# Patient Record
Sex: FEMALE | Race: WHITE | NOT HISPANIC OR LATINO | Employment: UNEMPLOYED | URBAN - METROPOLITAN AREA
[De-identification: names, ages, dates, MRNs, and addresses within clinical notes are randomized per-mention and may not be internally consistent; named-entity substitution may affect disease eponyms.]

---

## 2019-07-12 ENCOUNTER — HOSPITAL ENCOUNTER (EMERGENCY)
Facility: HOSPITAL | Age: 49
Discharge: LEFT BEFORE DISCHARGE | End: 2019-07-12
Admitting: EMERGENCY MEDICINE

## 2019-07-12 VITALS
HEART RATE: 83 BPM | BODY MASS INDEX: 26.29 KG/M2 | WEIGHT: 142.86 LBS | DIASTOLIC BLOOD PRESSURE: 91 MMHG | HEIGHT: 62 IN | OXYGEN SATURATION: 84 % | RESPIRATION RATE: 16 BRPM | SYSTOLIC BLOOD PRESSURE: 159 MMHG | TEMPERATURE: 97.9 F

## 2019-07-12 DIAGNOSIS — G44.201 ACUTE INTRACTABLE TENSION-TYPE HEADACHE: Primary | ICD-10-CM

## 2019-07-12 LAB
ALBUMIN SERPL-MCNC: 4.3 G/DL (ref 3.5–4.8)
ALBUMIN/GLOB SERPL: 1.5 G/DL (ref 1–1.7)
ALP SERPL-CCNC: 70 U/L (ref 32–91)
ALT SERPL W P-5'-P-CCNC: 20 U/L (ref 14–54)
ANION GAP SERPL CALCULATED.3IONS-SCNC: 18.4 MMOL/L (ref 5–15)
AST SERPL-CCNC: 21 U/L (ref 15–41)
BASOPHILS # BLD AUTO: 0.1 10*3/MM3 (ref 0–0.2)
BASOPHILS NFR BLD AUTO: 1.5 % (ref 0–1.5)
BILIRUB SERPL-MCNC: 1 MG/DL (ref 0.3–1.2)
BILIRUB UR QL STRIP: NEGATIVE
BUN BLD-MCNC: 15 MG/DL (ref 8–20)
BUN/CREAT SERPL: 16.7 (ref 5.4–26.2)
CALCIUM SPEC-SCNC: 9.3 MG/DL (ref 8.9–10.3)
CHLORIDE SERPL-SCNC: 99 MMOL/L (ref 101–111)
CLARITY UR: CLEAR
CO2 SERPL-SCNC: 20 MMOL/L (ref 22–32)
COLOR UR: YELLOW
CREAT BLD-MCNC: 0.9 MG/DL (ref 0.4–1)
DEPRECATED RDW RBC AUTO: 49.4 FL (ref 37–54)
EOSINOPHIL # BLD AUTO: 0.1 10*3/MM3 (ref 0–0.4)
EOSINOPHIL NFR BLD AUTO: 0.8 % (ref 0.3–6.2)
ERYTHROCYTE [DISTWIDTH] IN BLOOD BY AUTOMATED COUNT: 13.4 % (ref 12.3–15.4)
GFR SERPL CREATININE-BSD FRML MDRD: 66 ML/MIN/1.73
GLOBULIN UR ELPH-MCNC: 2.8 GM/DL (ref 2.5–3.8)
GLUCOSE BLD-MCNC: 110 MG/DL (ref 65–99)
GLUCOSE UR STRIP-MCNC: NEGATIVE MG/DL
HCT VFR BLD AUTO: 41 % (ref 34–46.6)
HGB BLD-MCNC: 14.3 G/DL (ref 12–15.9)
HGB UR QL STRIP.AUTO: NEGATIVE
HOLD SPECIMEN: NORMAL
KETONES UR QL STRIP: NEGATIVE
LEUKOCYTE ESTERASE UR QL STRIP.AUTO: NEGATIVE
LIPASE SERPL-CCNC: 28 U/L (ref 22–51)
LYMPHOCYTES # BLD AUTO: 3 10*3/MM3 (ref 0.7–3.1)
LYMPHOCYTES NFR BLD AUTO: 32.9 % (ref 19.6–45.3)
MACROCYTES BLD QL SMEAR: NORMAL
MCH RBC QN AUTO: 36.9 PG (ref 26.6–33)
MCHC RBC AUTO-ENTMCNC: 34.8 G/DL (ref 31.5–35.7)
MCV RBC AUTO: 106 FL (ref 79–97)
MONOCYTES # BLD AUTO: 0.6 10*3/MM3 (ref 0.1–0.9)
MONOCYTES NFR BLD AUTO: 6.4 % (ref 5–12)
NEUTROPHILS # BLD AUTO: 5.3 10*3/MM3 (ref 1.7–7)
NEUTROPHILS NFR BLD AUTO: 58.4 % (ref 42.7–76)
NITRITE UR QL STRIP: NEGATIVE
NRBC BLD AUTO-RTO: 0.2 /100 WBC (ref 0–0.2)
PH UR STRIP.AUTO: 6 [PH] (ref 5–8)
PLAT MORPH BLD: NORMAL
PLATELET # BLD AUTO: 267 10*3/MM3 (ref 140–450)
PMV BLD AUTO: 8.8 FL (ref 6–12)
POTASSIUM BLD-SCNC: 3.4 MMOL/L (ref 3.6–5.1)
PROT SERPL-MCNC: 7.1 G/DL (ref 6.1–7.9)
PROT UR QL STRIP: NEGATIVE
RBC # BLD AUTO: 3.87 10*6/MM3 (ref 3.77–5.28)
SODIUM BLD-SCNC: 134 MMOL/L (ref 136–144)
SP GR UR STRIP: <=1.005 (ref 1–1.03)
UROBILINOGEN UR QL STRIP: NORMAL
WBC MORPH BLD: NORMAL
WBC NRBC COR # BLD: 9.1 10*3/MM3 (ref 3.4–10.8)

## 2019-07-12 PROCEDURE — 25010000002 ONDANSETRON PER 1 MG: Performed by: PHYSICIAN ASSISTANT

## 2019-07-12 PROCEDURE — 85007 BL SMEAR W/DIFF WBC COUNT: CPT | Performed by: PHYSICIAN ASSISTANT

## 2019-07-12 PROCEDURE — 96374 THER/PROPH/DIAG INJ IV PUSH: CPT

## 2019-07-12 PROCEDURE — 83690 ASSAY OF LIPASE: CPT | Performed by: PHYSICIAN ASSISTANT

## 2019-07-12 PROCEDURE — 85025 COMPLETE CBC W/AUTO DIFF WBC: CPT | Performed by: PHYSICIAN ASSISTANT

## 2019-07-12 PROCEDURE — 99283 EMERGENCY DEPT VISIT LOW MDM: CPT

## 2019-07-12 PROCEDURE — 80053 COMPREHEN METABOLIC PANEL: CPT | Performed by: PHYSICIAN ASSISTANT

## 2019-07-12 PROCEDURE — 81003 URINALYSIS AUTO W/O SCOPE: CPT | Performed by: PHYSICIAN ASSISTANT

## 2019-07-12 RX ORDER — METOCLOPRAMIDE HYDROCHLORIDE 5 MG/ML
10 INJECTION INTRAMUSCULAR; INTRAVENOUS ONCE
Status: DISCONTINUED | OUTPATIENT
Start: 2019-07-12 | End: 2019-07-12 | Stop reason: HOSPADM

## 2019-07-12 RX ORDER — SODIUM CHLORIDE 0.9 % (FLUSH) 0.9 %
10 SYRINGE (ML) INJECTION AS NEEDED
Status: DISCONTINUED | OUTPATIENT
Start: 2019-07-12 | End: 2019-07-12 | Stop reason: HOSPADM

## 2019-07-12 RX ORDER — DIPHENHYDRAMINE HYDROCHLORIDE 50 MG/ML
25 INJECTION INTRAMUSCULAR; INTRAVENOUS ONCE
Status: DISCONTINUED | OUTPATIENT
Start: 2019-07-12 | End: 2019-07-12 | Stop reason: HOSPADM

## 2019-07-12 RX ORDER — ONDANSETRON 2 MG/ML
4 INJECTION INTRAMUSCULAR; INTRAVENOUS ONCE
Status: COMPLETED | OUTPATIENT
Start: 2019-07-12 | End: 2019-07-12

## 2019-07-12 RX ORDER — SODIUM CHLORIDE 9 MG/ML
125 INJECTION, SOLUTION INTRAVENOUS CONTINUOUS
Status: DISCONTINUED | OUTPATIENT
Start: 2019-07-12 | End: 2019-07-12 | Stop reason: HOSPADM

## 2019-07-12 RX ADMIN — ONDANSETRON 4 MG: 2 INJECTION INTRAMUSCULAR; INTRAVENOUS at 19:40

## 2019-07-12 NOTE — ED PROVIDER NOTES
"Subjective   History of Present Illness  Patient is a 48-year-old female [facility] significant for hypertension, hyperlipidemia, chronic back pain who presents with headache with bandlike distribution across her forehead for the past 2 days.  She describes it as an intermittent throbbing type headache.  Asked thunderclap or worst headache of her life.  Currently rates it mild in severity.  She did have some associated dizziness earlier today but does not have any right now.  Patient states she is also noticed that her blood pressure has been elevated over the past 2 days.  Patient states she is on blood pressure medication but only takes it when she \"feels like she needs to\" day she will take a half a pill other day she will take a full pill depending on how she feels.  She reports associated nausea.  Recent fever, chest pain, vomiting, abdominal pain.  Patient states she does have shortness of breath but states it is been occurring for several months and she does have a follow-up with pulmonology in September.  Dates her shortness of breath has not increased over the past 2 days.  Patient states she does smoke 2 packs of cigarettes daily.  She does drink 2 mixed drinks 2-3 times a week.  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  She also reports increased frequency of urination but denies any dysuria or hematuria.  . Review of Systems   Constitutional: Negative for appetite change, chills, diaphoresis, fatigue and fever.   HENT: Negative for congestion, dental problem, ear pain, facial swelling, nosebleeds, postnasal drip, rhinorrhea, sinus pressure, sinus pain, trouble swallowing and voice change.    Respiratory: Positive for shortness of breath. Negative for apnea, cough, choking, chest tightness, wheezing and stridor.    Cardiovascular: Negative for chest pain and palpitations.   Gastrointestinal: Positive for nausea. Negative for abdominal pain, constipation, diarrhea and " vomiting.   Genitourinary: Positive for frequency and urgency. Negative for decreased urine volume, difficulty urinating, flank pain, hematuria, pelvic pain, vaginal discharge and vaginal pain.   Musculoskeletal: Positive for back pain. Negative for gait problem, joint swelling and neck pain.   Skin: Negative for color change, rash and wound.   Neurological: Positive for dizziness and headaches. Negative for tremors, seizures, syncope, facial asymmetry, speech difficulty, weakness, light-headedness and numbness.   Hematological: Does not bruise/bleed easily.       Past Medical History:   Diagnosis Date   • COPD (chronic obstructive pulmonary disease) (CMS/Formerly [facility])    • Hyperlipidemia    • Hypertension    • Stroke (CMS/[facility]ly [facility])        Allergies   Allergen Reactions   • Iodinated Diagnostic Agents Anaphylaxis   • Methylprednisolone Palpitations   • Prednisone Palpitations       Past Surgical History:   Procedure Laterality Date   • HYSTERECTOMY         History reviewed. No pertinent family history.    Social History     Socioeconomic History   • Marital status:      Spouse name: Not on file   • Number of children: Not on file   • Years of education: Not on file   • Highest education level: Not on file   Tobacco Use   • Smoking status: Current Every Day Smoker     Packs/day: 2.00     Types: Cigarettes   • Smokeless tobacco: Never Used   Substance and Sexual Activity   • Alcohol use: Yes     Alcohol/week: 1.8 oz     Types: 3 Shots of liquor per week   • Drug use: No   • Sexual activity: Yes           Objective   Physical Exam  The patient is well developed, well nourished, alert, cooperative and in no acute distress.    HEENT exam is normocephalic atraumatic. Pupils are equal round and reactive to light. EOMI.  Mucous membranes are moist.     Lungs expiratory wheezes to auscultation bilaterally chest wall expansion equal without retraction    Cardiovascular:  Regular rate and rhythm without murmur there is no peripheral  "edema, cyanosis or pallor.        Abdomen is soft, nontender, nondistended.  No guarding or rebound noted  noted. Bowel sounds are present.      Back shows no CVA tenderness.  Neck and back show no spinal tenderness or bony step-off.  There is no asymmetry of spinal muscles , no tenderness, decreased range of motion or muscular spasm.      Neurologic:  Alert and oriented without focal neurological deficits. GCS 15.  Normal and equal sensation and strength throughout.    Skin shows no rash, petechiae, or purpura.    Procedures           ED Course  ED Course as of Jul 12 2031 Fri Jul 12, 2019   1938 BP: (!) 189/111 [AA]      ED Course User Index  [AA] Stephanie Sen PA    /91   Pulse 83   Temp 97.9 °F (36.6 °C) (Oral)   Resp 16   Ht 157.5 cm (62\")   Wt 64.8 kg (142 lb 13.7 oz)   SpO2 (!) 84%   BMI 26.13 kg/m²   Labs Reviewed   COMPREHENSIVE METABOLIC PANEL - Abnormal; Notable for the following components:       Result Value    Glucose 110 (*)     Sodium 134 (*)     Potassium 3.4 (*)     Chloride 99 (*)     CO2 20.0 (*)     Anion Gap 18.4 (*)     All other components within normal limits   URINALYSIS W/ MICROSCOPIC IF INDICATED (NO CULTURE) - Normal    Narrative:     Urine microscopic not indicated.   LIPASE - Normal   CBC WITH AUTO DIFFERENTIAL   SCAN SLIDE   EXTRA TUBES    Narrative:     The following orders were created for panel order Extra Tubes.  Procedure                               Abnormality         Status                     ---------                               -----------         ------                     Gold Top - SST[[id number]]                                   Final result                 Please view results for these tests on the individual orders.   [facility]   CBC AND DIFFERENTIAL    Narrative:     The following orders were created for panel order CBC & Differential.  Procedure                               Abnormality         Status                     ---------       "                         -----------         ------                     Scan Slide[[id number]]                                       In process                 CBC Auto Differential[[id number]]                            In process                   Please view results for these tests on the individual orders.                   MDM  Number of Diagnoses or Management Options  Diagnosis management comments: Chart Review:  Comorbidity: Pretension, hyperlipidemia, back pain  Differentials: CVA, migraine, hypertensive crisis, neuritis;this list is not all inclusive and does not constitute the entirety of considered causes    Labs: Urinalysis was negative for UTI.  CBC and CMP are pending.  Imaging: Was interpreted by physician and reviewed by myself: CT of head was ordered but patient declined.  Disposition/Treatment: While in the ED IV was placed labs were obtained.  While in the ED IV was placed labs were ordered but patient stated that she wanted to leave that she was feeling better.  She declined all medication including fluids, Compazine, Benadryl. She  Did take the Zofran for nausea.  Went back in and spoke with the patient in regards to why she did not want any of the medication or lab work-up or head CT.  Patient states that she really did not want to stay she states that she was feeling better and will follow up with primary care first thing on Monday.  Discussion with the patient of importance of compliance with her current medication because she states she does not take her blood pressure medication as prescribed.  Discussed risks of leaving AMA.  Patient states she still wanted to leave AMA.  She was stable throughout ED stay and at time of discharge.         Amount and/or Complexity of Data Reviewed  Clinical lab tests: reviewed  Decide to obtain previous medical records or to obtain history from someone other than the patient: yes          Final diagnoses:   Acute intractable tension-type headache             Stephanie Sen PA  07/12/19 2031

## 2020-02-06 ENCOUNTER — APPOINTMENT (OUTPATIENT)
Dept: GENERAL RADIOLOGY | Facility: HOSPITAL | Age: 50
End: 2020-02-06

## 2020-02-06 ENCOUNTER — HOSPITAL ENCOUNTER (EMERGENCY)
Facility: HOSPITAL | Age: 50
Discharge: HOME | End: 2020-02-06
Admitting: EMERGENCY MEDICINE

## 2020-02-06 VITALS
TEMPERATURE: 97.9 F | OXYGEN SATURATION: 98 % | HEART RATE: 77 BPM | RESPIRATION RATE: 16 BRPM | BODY MASS INDEX: 24.67 KG/M2 | SYSTOLIC BLOOD PRESSURE: 147 MMHG | DIASTOLIC BLOOD PRESSURE: 86 MMHG | WEIGHT: 134.04 LBS | HEIGHT: 62 IN

## 2020-02-06 DIAGNOSIS — R05.3 CHRONIC COUGH: ICD-10-CM

## 2020-02-06 DIAGNOSIS — J40 BRONCHITIS: Primary | ICD-10-CM

## 2020-02-06 DIAGNOSIS — F17.200 SMOKER: ICD-10-CM

## 2020-02-06 LAB
BASOPHILS # BLD AUTO: 0.1 10*3/MM3 (ref 0–0.2)
BASOPHILS NFR BLD AUTO: 1.4 % (ref 0–1.5)
DEPRECATED RDW RBC AUTO: 50.8 FL (ref 37–54)
EOSINOPHIL # BLD AUTO: 0.1 10*3/MM3 (ref 0–0.4)
EOSINOPHIL NFR BLD AUTO: 1.2 % (ref 0.3–6.2)
ERYTHROCYTE [DISTWIDTH] IN BLOOD BY AUTOMATED COUNT: 13.7 % (ref 12.3–15.4)
FLUAV SUBTYP SPEC NAA+PROBE: NOT DETECTED
FLUBV RNA ISLT QL NAA+PROBE: NOT DETECTED
HCT VFR BLD AUTO: 42.5 % (ref 34–46.6)
HGB BLD-MCNC: 14.9 G/DL (ref 12–15.9)
LYMPHOCYTES # BLD AUTO: 2.1 10*3/MM3 (ref 0.7–3.1)
LYMPHOCYTES NFR BLD AUTO: 36.8 % (ref 19.6–45.3)
MCH RBC QN AUTO: 37.2 PG (ref 26.6–33)
MCHC RBC AUTO-ENTMCNC: 35 G/DL (ref 31.5–35.7)
MCV RBC AUTO: 106.3 FL (ref 79–97)
MONOCYTES # BLD AUTO: 0.4 10*3/MM3 (ref 0.1–0.9)
MONOCYTES NFR BLD AUTO: 6.4 % (ref 5–12)
NEUTROPHILS # BLD AUTO: 3.1 10*3/MM3 (ref 1.7–7)
NEUTROPHILS NFR BLD AUTO: 54.2 % (ref 42.7–76)
NRBC BLD AUTO-RTO: 0 /100 WBC (ref 0–0.2)
PLATELET # BLD AUTO: 236 10*3/MM3 (ref 140–450)
PMV BLD AUTO: 8.3 FL (ref 6–12)
RBC # BLD AUTO: 4 10*6/MM3 (ref 3.77–5.28)
WBC NRBC COR # BLD: 5.7 10*3/MM3 (ref 3.4–10.8)

## 2020-02-06 PROCEDURE — 71046 X-RAY EXAM CHEST 2 VIEWS: CPT

## 2020-02-06 PROCEDURE — 99283 EMERGENCY DEPT VISIT LOW MDM: CPT

## 2020-02-06 PROCEDURE — 87502 INFLUENZA DNA AMP PROBE: CPT | Performed by: NURSE PRACTITIONER

## 2020-02-06 PROCEDURE — 85025 COMPLETE CBC W/AUTO DIFF WBC: CPT | Performed by: NURSE PRACTITIONER

## 2020-02-06 RX ORDER — RANITIDINE 150 MG/1
150 TABLET ORAL 2 TIMES DAILY
COMMUNITY
End: 2021-09-30

## 2020-02-06 RX ORDER — HYDROCODONE BITARTRATE AND ACETAMINOPHEN 10; 325 MG/1; MG/1
1 TABLET ORAL
COMMUNITY

## 2020-02-06 RX ORDER — LOSARTAN POTASSIUM 25 MG/1
25 TABLET ORAL DAILY
COMMUNITY
End: 2021-09-30

## 2020-02-06 RX ORDER — ATORVASTATIN CALCIUM 80 MG/1
80 TABLET, FILM COATED ORAL DAILY
COMMUNITY
End: 2021-09-23

## 2020-02-06 RX ORDER — AZITHROMYCIN 250 MG/1
TABLET, FILM COATED ORAL
Qty: 6 TABLET | Refills: 0 | OUTPATIENT
Start: 2020-02-06 | End: 2021-09-19

## 2020-02-06 NOTE — DISCHARGE INSTRUCTIONS
Patient encouraged to quit smoking she is to take antibiotic as directed she is to follow-up if increased symptoms

## 2020-02-06 NOTE — ED PROVIDER NOTES
Subjective   Patient is a 49-year-old white female looking older than current age comes in with complaints of cough right-sided rib pain hurts when she coughs states was up all night last night coughing nonproductive no fevers not been around any sick contacts is a smoker denies any chest pain no shortness of breath no GI or  symptoms.   is allergic to steroids makes her heart race and blood pressure go up.   cannot take most inhalers due to this is supposed to see a pulmonologist but he is to see docs and been putting it off.      History provided by:  Patient  Cough   Cough characteristics:  Non-productive  Sputum characteristics:  Nondescript  Severity:  Moderate  Onset quality:  Gradual  Duration:  2 days  Timing:  Unable to specify  Progression:  Worsening  Chronicity:  Recurrent  Smoker: yes    Context: not animal exposure, not exposure to allergens, not fumes, not occupational exposure, not sick contacts, not smoke exposure, not upper respiratory infection, not weather changes and not with activity    Relieved by:  None tried  Worsened by:  Nothing  Ineffective treatments:  None tried  Associated symptoms: no chest pain, no chills, no diaphoresis, no ear fullness, no ear pain, no eye discharge, no fever, no headaches, no myalgias, no rash, no rhinorrhea, no shortness of breath, no sinus congestion, no sore throat, no weight loss and no wheezing        Review of Systems   Constitutional: Negative for activity change, appetite change, chills, diaphoresis, fatigue, fever and weight loss.   HENT: Negative for congestion, ear pain, rhinorrhea, sore throat and trouble swallowing.    Eyes: Negative for discharge and redness.   Respiratory: Positive for cough. Negative for apnea, chest tightness, shortness of breath, wheezing and stridor.    Cardiovascular: Negative for chest pain, palpitations and leg swelling.   Gastrointestinal: Negative for abdominal distention, abdominal pain and nausea.    Musculoskeletal: Positive for arthralgias. Negative for back pain, gait problem, joint swelling, myalgias and neck pain.        Right side rib pain   Skin: Negative for color change, pallor and rash.   Neurological: Negative for dizziness, numbness and headaches.       Past Medical History:   Diagnosis Date   • COPD (chronic obstructive pulmonary disease) (CMS/[facility])    • Hyperlipidemia    • Hypertension    • Stroke (CMS/[facility])        Allergies   Allergen Reactions   • Iodinated Diagnostic Agents Anaphylaxis   • Methylprednisolone Palpitations   • Prednisone Palpitations       Past Surgical History:   Procedure Laterality Date   • HYSTERECTOMY         History reviewed. No pertinent family history.    Social History     Socioeconomic History   • Marital status:      Spouse name: Not on file   • Number of children: Not on file   • Years of education: Not on file   • Highest education level: Not on file   Tobacco Use   • Smoking status: Current Every Day Smoker     Packs/day: 2.00     Types: Cigarettes   • Smokeless tobacco: Never Used   Substance and Sexual Activity   • Alcohol use: Yes     Alcohol/week: 3.0 standard drinks     Types: 3 Shots of liquor per week   • Drug use: No   • Sexual activity: Yes           Objective   Physical Exam   Constitutional: She is oriented to person, place, and time. She appears well-developed and well-nourished. No distress.   Patient smells of smoke looks older than current age   HENT:   Head: Normocephalic and atraumatic.   Right Ear: External ear normal.   Left Ear: External ear normal.   Nose: Nose normal.   Mouth/Throat: Oropharynx is clear and moist. No oropharyngeal exudate.   Eyes: Pupils are equal, round, and reactive to light. Conjunctivae and EOM are normal.   Neck: Normal range of motion. Neck supple.   Cardiovascular: Normal rate, regular rhythm, normal heart sounds and intact distal pulses. Exam reveals no gallop and no friction rub.   No murmur  heard.  Pulmonary/Chest: Effort normal and breath sounds normal. No stridor. No respiratory distress. She has no wheezes. She has no rales. She exhibits no tenderness.   Abdominal: Soft. Bowel sounds are normal. She exhibits no distension. There is no tenderness.   Musculoskeletal: Normal range of motion.   Neurological: She is alert and oriented to person, place, and time.   Skin: Skin is warm and dry. No rash noted. She is not diaphoretic.   Psychiatric: She has a normal mood and affect. Her behavior is normal. Judgment and thought content normal.   Nursing note and vitals reviewed.      Procedures           ED Course  ED Course as of Feb 06 1118   Thu Feb 06, 2020   1045 CBC normal influenza negative    []   1046 Chest x-ray normal    []      ED Course User Index  [] Eliana Cedillo APRN           Xr Chest 2 View    Result Date: 2/6/2020  No change from the previous study with no evidence for acute cardiopulmonary process.  Electronically Signed By-Francisco Snow On:2/6/2020 10:42 AM This report was finalized on [id number] by  Francisco Snow, .    Medications - No data to display  Labs Reviewed   CBC WITH AUTO DIFFERENTIAL - Abnormal; Notable for the following components:       Result Value    .3 (*)     MCH 37.2 (*)     All other components within normal limits   INFLUENZA ANTIGEN, RAPID - Normal   CBC AND DIFFERENTIAL    Narrative:     The following orders were created for panel order CBC & Differential.  Procedure                               Abnormality         Status                     ---------                               -----------         ------                     CBC Auto Differential[[id number]]        Abnormal            Final result                 Please view results for these tests on the individual orders.                                         MDM  Number of Diagnoses or Management Options  Bronchitis: minor  Chronic cough: minor  Smoker:   Diagnosis management comments:  Disposition: Discharged.    Patient discharged in stable condition.  Patient to keep her appointment with pulmonologist  strongly encouraged to quit smoking  Reviewed implications of results, diagnosis, meds, responsibility to follow up, warning signs and symptoms of possible worsening, potential complications and reasons to return to ER increase symptoms chest pain shortness of breath    Patient/Family voiced understanding of above instructions.    Discussed plan for discharge, as there is no emergent indication for admission.  Pt/family is agreeable and understands need for follow up and repeat testing.  Pt is aware that discharge does not mean that nothing is wrong but it indicates no emergency is present and they must continue care with follow-up as given below or physician of their choice.     FOLLOW-UP  Zari Church, [id number] [place] [address] [place] [phone] an appointment as soon as possible for a visit in 2 daysIf symptoms worsen  [facility] EMERGENCY [id number] [facility] [phone] symptoms worsen       Medication List      New Prescriptions    azithromycin 250 MG tablet  Commonly known as:  ZITHROMAX  Take 2 tablets the first day, then 1 tablet daily for 4 days.               Amount and/or Complexity of Data Reviewed  Clinical lab tests: reviewed  Tests in the radiology section of CPT®: reviewed        Final diagnoses:   Bronchitis   Chronic cough   Smoker            Eliana Cedillo, APRN  02/06/20 1119

## 2020-02-06 NOTE — ED NOTES
Pt presents with chronic congestion cough. States has always had a cough but worse the past few days. Reports pain to R ribs. Denies injury. Pain with breathing. Reports being unable to sleep. States was dx with COPD but has not f/u with pulmonology.      Ebenezer Buitrago, ALEX  02/06/20 8681

## 2020-08-20 ENCOUNTER — APPOINTMENT (OUTPATIENT)
Dept: CT IMAGING | Facility: HOSPITAL | Age: 50
End: 2020-08-20

## 2020-08-20 ENCOUNTER — HOSPITAL ENCOUNTER (EMERGENCY)
Facility: HOSPITAL | Age: 50
Discharge: HOME | End: 2020-08-20
Attending: EMERGENCY MEDICINE | Admitting: EMERGENCY MEDICINE

## 2020-08-20 VITALS
DIASTOLIC BLOOD PRESSURE: 73 MMHG | HEIGHT: 62 IN | RESPIRATION RATE: 18 BRPM | BODY MASS INDEX: 23.81 KG/M2 | TEMPERATURE: 98.9 F | WEIGHT: 129.41 LBS | SYSTOLIC BLOOD PRESSURE: 157 MMHG | HEART RATE: 88 BPM | OXYGEN SATURATION: 99 %

## 2020-08-20 DIAGNOSIS — R51.9 NONINTRACTABLE HEADACHE, UNSPECIFIED CHRONICITY PATTERN, UNSPECIFIED HEADACHE TYPE: ICD-10-CM

## 2020-08-20 DIAGNOSIS — B34.9 VIRAL SYNDROME: Primary | ICD-10-CM

## 2020-08-20 LAB — SARS-COV-2 RNA PNL SPEC NAA+PROBE: NOT DETECTED

## 2020-08-20 PROCEDURE — 99283 EMERGENCY DEPT VISIT LOW MDM: CPT

## 2020-08-20 PROCEDURE — 70450 CT HEAD/BRAIN W/O DYE: CPT

## 2020-08-20 PROCEDURE — 87635 SARS-COV-2 COVID-19 AMP PRB: CPT | Performed by: EMERGENCY MEDICINE

## 2020-08-20 NOTE — ED PROVIDER NOTES
Subjective   Chief complaint: Fever    50-year-old female presents reporting a fever.  Patient states she was not feeling well today just in general.  She had a mild headache earlier.  She bought a new thermometer and took her temperature and has had 105.3.  She states after arriving to the emergency room she is feeling somewhat better.  She denies headache now.  She has a history of COPD and states she has had some mild cough and congestion but states it is nothing out of the ordinary.  She feels like she is at her baseline from a respiratory standpoint.  She states she did not take any Tylenol or ibuprofen.  She was afebrile on arrival to the emergency room.  She states she is wondering if she bought a bad thermometer.      History provided by:  Patient      Review of Systems   Constitutional: Positive for fever.   HENT: Negative for congestion and sore throat.    Eyes: Negative for redness.   Respiratory: Positive for cough. Negative for shortness of breath.    Cardiovascular: Negative for chest pain.   Gastrointestinal: Negative for abdominal pain, diarrhea and vomiting.   Genitourinary: Negative for dysuria.   Musculoskeletal: Negative for back pain.   Skin: Negative for rash.   Neurological: Positive for headaches. Negative for dizziness.   Psychiatric/Behavioral: Negative for confusion.       Past Medical History:   Diagnosis Date   • COPD (chronic obstructive pulmonary disease) (CMS/[facility])    • Hyperlipidemia    • Hypertension    • Stroke (CMS/[facility])        Allergies   Allergen Reactions   • Iodinated Diagnostic Agents Anaphylaxis   • Methylprednisolone Palpitations   • Prednisone Palpitations       Past Surgical History:   Procedure Laterality Date   • HYSTERECTOMY         No family history on file.    Social History     Socioeconomic History   • Marital status:      Spouse name: Not on file   • Number of children: Not on file   • Years of education: Not on file   • Highest education level: Not on file  "  Tobacco Use   • Smoking status: Current Every Day Smoker     Packs/day: 2.00     Types: Cigarettes   • Smokeless tobacco: Never Used   Substance and Sexual Activity   • Alcohol use: Yes     Alcohol/week: 3.0 standard drinks     Types: 3 Shots of liquor per week   • Drug use: No   • Sexual activity: Yes       /61 (BP Location: Right arm, Patient Position: Sitting)   Pulse 82   Temp 98.7 °F (37.1 °C) (Oral)   Resp 18   Ht 157.5 cm (62\")   Wt 58.7 kg (129 lb 6.6 oz)   SpO2 98%   BMI 23.67 kg/m²       Objective   Physical Exam   Constitutional: She is oriented to person, place, and time. She appears well-developed and well-nourished.   HENT:   Head: Normocephalic and atraumatic.   Eyes: Pupils are equal, round, and reactive to light. EOM are normal.   Neck: Normal range of motion. Neck supple.   No meningismus   Cardiovascular: Normal rate, regular rhythm and normal heart sounds.   Pulmonary/Chest: Effort normal and breath sounds normal. No respiratory distress.   Abdominal: Soft. Bowel sounds are normal. There is no tenderness.   Musculoskeletal: Normal range of motion.   Neurological: She is alert and oriented to person, place, and time.   Skin: Skin is warm and dry.   Nursing note and vitals reviewed.      Procedures           ED Course      Results for orders placed or performed during the hospital encounter of 08/20/20   COVID-19,[employer] IN-HOUSE,Nasal Swab No Transport Media 3-4 HR TAT - Swab, Nasal Cavity   Result Value Ref Range    COVID19 Not Detected Not Detected - Ref. Range       CT head shows no acute intracranial abnormality.                                     MDM   Patient had the above evaluation.  Results were discussed with the patient.  Patient is well-appearing in the emergency room.  CT head showed no acute abnormality.  She continues to deny any headache at this time.  COVID screen was negative.  Patient is afebrile.  She is stable for discharge.      Final diagnoses:   Viral " syndrome   Nonintractable headache, unspecified chronicity pattern, unspecified headache type            Damien Alvarez MD  08/20/20 2009

## 2021-09-19 ENCOUNTER — APPOINTMENT (OUTPATIENT)
Dept: GENERAL RADIOLOGY | Facility: HOSPITAL | Age: 51
End: 2021-09-19

## 2021-09-19 ENCOUNTER — HOSPITAL ENCOUNTER (EMERGENCY)
Facility: HOSPITAL | Age: 51
Discharge: LEFT BEFORE DISCHARGE | End: 2021-09-19
Admitting: EMERGENCY MEDICINE

## 2021-09-19 VITALS
WEIGHT: 119.93 LBS | SYSTOLIC BLOOD PRESSURE: 161 MMHG | HEART RATE: 97 BPM | BODY MASS INDEX: 22.07 KG/M2 | HEIGHT: 62 IN | TEMPERATURE: 98.2 F | RESPIRATION RATE: 18 BRPM | OXYGEN SATURATION: 97 % | DIASTOLIC BLOOD PRESSURE: 91 MMHG

## 2021-09-19 DIAGNOSIS — R07.9 CHEST PAIN, UNSPECIFIED TYPE: Primary | ICD-10-CM

## 2021-09-19 LAB
ANION GAP SERPL CALCULATED.3IONS-SCNC: 15 MMOL/L (ref 5–15)
BASOPHILS # BLD AUTO: 0.1 10*3/MM3 (ref 0–0.2)
BASOPHILS NFR BLD AUTO: 0.5 % (ref 0–1.5)
BUN SERPL-MCNC: 12 MG/DL (ref 6–20)
BUN/CREAT SERPL: 16.4 (ref 7–25)
CALCIUM SPEC-SCNC: 9.4 MG/DL (ref 8.6–10.5)
CHLORIDE SERPL-SCNC: 102 MMOL/L (ref 98–107)
CO2 SERPL-SCNC: 23 MMOL/L (ref 22–29)
CREAT SERPL-MCNC: 0.73 MG/DL (ref 0.57–1)
DEPRECATED RDW RBC AUTO: 48.6 FL (ref 37–54)
EOSINOPHIL # BLD AUTO: 0 10*3/MM3 (ref 0–0.4)
EOSINOPHIL NFR BLD AUTO: 0.5 % (ref 0.3–6.2)
ERYTHROCYTE [DISTWIDTH] IN BLOOD BY AUTOMATED COUNT: 13.2 % (ref 12.3–15.4)
GFR SERPL CREATININE-BSD FRML MDRD: 83 ML/MIN/1.73
GLUCOSE SERPL-MCNC: 99 MG/DL (ref 65–99)
HCT VFR BLD AUTO: 39.8 % (ref 34–46.6)
HGB BLD-MCNC: 13.9 G/DL (ref 12–15.9)
HOLD SPECIMEN: NORMAL
HOLD SPECIMEN: NORMAL
LYMPHOCYTES # BLD AUTO: 2.1 10*3/MM3 (ref 0.7–3.1)
LYMPHOCYTES NFR BLD AUTO: 19.9 % (ref 19.6–45.3)
MCH RBC QN AUTO: 36.9 PG (ref 26.6–33)
MCHC RBC AUTO-ENTMCNC: 34.9 G/DL (ref 31.5–35.7)
MCV RBC AUTO: 105.6 FL (ref 79–97)
MONOCYTES # BLD AUTO: 0.6 10*3/MM3 (ref 0.1–0.9)
MONOCYTES NFR BLD AUTO: 6.1 % (ref 5–12)
NEUTROPHILS NFR BLD AUTO: 7.7 10*3/MM3 (ref 1.7–7)
NEUTROPHILS NFR BLD AUTO: 73 % (ref 42.7–76)
NRBC BLD AUTO-RTO: 0.1 /100 WBC (ref 0–0.2)
NT-PROBNP SERPL-MCNC: 432.8 PG/ML (ref 0–900)
PLATELET # BLD AUTO: 260 10*3/MM3 (ref 140–450)
PMV BLD AUTO: 8.2 FL (ref 6–12)
POTASSIUM SERPL-SCNC: 4.3 MMOL/L (ref 3.5–5.2)
RBC # BLD AUTO: 3.77 10*6/MM3 (ref 3.77–5.28)
SARS-COV-2 RNA PNL SPEC NAA+PROBE: NOT DETECTED
SODIUM SERPL-SCNC: 140 MMOL/L (ref 136–145)
TROPONIN T SERPL-MCNC: <0.01 NG/ML (ref 0–0.03)
WBC # BLD AUTO: 10.6 10*3/MM3 (ref 3.4–10.8)
WHOLE BLOOD HOLD SPECIMEN: NORMAL

## 2021-09-19 PROCEDURE — 85025 COMPLETE CBC W/AUTO DIFF WBC: CPT | Performed by: PHYSICIAN ASSISTANT

## 2021-09-19 PROCEDURE — 93005 ELECTROCARDIOGRAM TRACING: CPT

## 2021-09-19 PROCEDURE — 84484 ASSAY OF TROPONIN QUANT: CPT | Performed by: PHYSICIAN ASSISTANT

## 2021-09-19 PROCEDURE — 99283 EMERGENCY DEPT VISIT LOW MDM: CPT

## 2021-09-19 PROCEDURE — 80048 BASIC METABOLIC PNL TOTAL CA: CPT | Performed by: PHYSICIAN ASSISTANT

## 2021-09-19 PROCEDURE — 87635 SARS-COV-2 COVID-19 AMP PRB: CPT | Performed by: PHYSICIAN ASSISTANT

## 2021-09-19 PROCEDURE — 83880 ASSAY OF NATRIURETIC PEPTIDE: CPT | Performed by: PHYSICIAN ASSISTANT

## 2021-09-19 PROCEDURE — 71045 X-RAY EXAM CHEST 1 VIEW: CPT

## 2021-09-19 RX ORDER — SODIUM CHLORIDE 0.9 % (FLUSH) 0.9 %
10 SYRINGE (ML) INJECTION AS NEEDED
Status: DISCONTINUED | OUTPATIENT
Start: 2021-09-19 | End: 2021-09-19 | Stop reason: HOSPADM

## 2021-09-19 RX ORDER — ASPIRIN 81 MG/1
324 TABLET, CHEWABLE ORAL ONCE
Status: COMPLETED | OUTPATIENT
Start: 2021-09-19 | End: 2021-09-19

## 2021-09-19 RX ADMIN — ASPIRIN 81 MG CHEWABLE TABLET 324 MG: 81 TABLET CHEWABLE at 16:05

## 2021-09-19 RX ADMIN — NITROGLYCERIN 1 INCH: 20 OINTMENT TOPICAL at 16:05

## 2021-09-19 RX ADMIN — SODIUM CHLORIDE 500 ML: 9 INJECTION, SOLUTION INTRAVENOUS at 16:09

## 2021-09-19 NOTE — ED PROVIDER NOTES
Subjective   History:  Patient is a 51-year-old female presents to the ER with 5 days of chest pain associated with nausea and difficulty controlling her blood pressure today.  She reports she took half of her blood pressure medication and not a whole 1 because sometimes the bottoms are out.  She reports that she will occasionally have intermittent chest pain but nothing that persist as long as it has today.  She reports because her heart rate was around 60 at home she did not take any of her pain medication for her chronic pain.  No history of CAD.  Does have previous history of CVA.  No radiation to neck or arm reported    Onset: 5 days  Location: Chest  Duration: Constant  Character: Dull pressure  Aggravating/Alleviating factors: None  Radiation none  Severity: Moderate            Review of Systems   Constitutional: Negative for diaphoresis, fatigue and fever.   Respiratory: Negative for cough, choking and shortness of breath.    Cardiovascular: Positive for chest pain.   Gastrointestinal: Positive for nausea. Negative for abdominal distention, abdominal pain and vomiting.   Genitourinary: Negative.    Musculoskeletal: Negative.    Skin: Negative.    Neurological: Negative.    Psychiatric/Behavioral: Negative.        Past Medical History:   Diagnosis Date   • COPD (chronic obstructive pulmonary disease) (CMS/[facility])    • Hyperlipidemia    • Hypertension    • Stroke (CMS/[facility])        Allergies   Allergen Reactions   • Iodinated Diagnostic Agents Anaphylaxis   • Methylprednisolone Palpitations   • Prednisone Palpitations       Past Surgical History:   Procedure Laterality Date   • HYSTERECTOMY         No family history on file.    Social History     Socioeconomic History   • Marital status:      Spouse name: Not on file   • Number of children: Not on file   • Years of education: Not on file   • Highest education level: Not on file   Tobacco Use   • Smoking status: Current Every Day Smoker     Packs/day: 2.00      Types: Cigarettes   • Smokeless tobacco: Never Used   Substance and Sexual Activity   • Alcohol use: Yes     Alcohol/week: 3.0 standard drinks     Types: 3 Shots of liquor per week   • Drug use: No   • Sexual activity: Yes           Objective   Physical Exam  Vitals and nursing note reviewed.   Constitutional:       Appearance: She is well-developed.   HENT:      Head: Normocephalic and atraumatic.   Eyes:      Pupils: Pupils are equal, round, and reactive to light.   Cardiovascular:      Rate and Rhythm: Normal rate and regular rhythm.      Heart sounds: Normal heart sounds.   Pulmonary:      Effort: Pulmonary effort is normal.      Breath sounds: Normal breath sounds. No decreased breath sounds, wheezing, rhonchi or rales.   Musculoskeletal:         General: Normal range of motion.      Cervical back: Normal range of motion.   Skin:     General: Skin is warm and dry.   Neurological:      General: No focal deficit present.      Mental Status: She is alert and oriented to person, place, and time.   Psychiatric:         Mood and Affect: Mood is anxious.         Behavior: Behavior normal.         Procedures           ED Course  ED Course as of Sep 19 1723   Sun Sep 19, 2021   1534 EKG interpreted by ER physician reviewed by myself.  Sinus tachycardia rate of 101 ventricular bigeminy.    [MG]   1648 Reports chest pain is now relieved    [MG]      ED Course User Index  [MG] Marah Pearce, WILFRIDO      XR Chest 1 View    Result Date: 9/19/2021  No acute cardiopulmonary process.  Electronically Signed By-Freida Schneider MD On:9/19/2021 3:40 PM This report was finalized on [id number] by  Freida Schneider MD.    Labs Reviewed   CBC WITH AUTO DIFFERENTIAL - Abnormal; Notable for the following components:       Result Value    .6 (*)     MCH 36.9 (*)     Neutrophils, Absolute 7.70 (*)     All other components within normal limits    Narrative:     Appended report. These results have been appended to a previously verified  report.   COVID-19,CEPHEID/MAURISIO/BDMAX,COR/NIKITA/PAD/ANMOL IN-HOUSE,NP SWAB IN TRANSPORT MEDIA 3-4 HR TAT, RT-PCR - Normal    Narrative:     Fact sheet for providers: https://www.fda.gov/media/844899/download     Fact sheet for patients: https://www.fda.gov/media/835989/download  Fact sheet for providers: https://www.fda.gov/media/243847/download    Fact sheet for patients: https://www.fda.gov/media/765644/download    Test performed by PCR.   BASIC METABOLIC PANEL - Normal    Narrative:     GFR Normal >60  Chronic Kidney Disease <60  Kidney Failure <15     BNP (IN-HOUSE) - Normal    Narrative:     Among patients with dyspnea, NT-proBNP is highly sensitive for the detection of acute congestive heart failure. In addition NT-proBNP of <300 pg/ml effectively rules out acute congestive heart failure with 99% negative predictive value.    Results may be falsely decreased if patient taking Biotin.     TROPONIN (IN-HOUSE) - Normal    Narrative:     Troponin T Reference Range:  <= 0.03 ng/mL-   Negative for AMI  >0.03 ng/mL-     Abnormal for myocardial necrosis.  Clinicians would have to utilize clinical acumen, EKG, Troponin and serial changes to determine if it is an Acute Myocardial Infarction or myocardial injury due to an underlying chronic condition.       Results may be falsely decreased if patient taking Biotin.     CBC AND DIFFERENTIAL    Narrative:     The following orders were created for panel order CBC & Differential.  Procedure                               Abnormality         Status                     ---------                               -----------         ------                     CBC Auto Differential[[id number]]        Abnormal            Final result               Scan Slide[[id number]]                                                                    Please view results for these tests on the individual orders.   EXTRA TUBES    Narrative:     The following orders were created for panel order Extra  Tubes.  Procedure                               Abnormality         Status                     ---------                               -----------         ------                     Gold Top - SST[[id number]]                                   Final result               Green Top (Gel)[[id number]]                                  Final result               Light Blue Top[[id number]]                                   Final result                 Please view results for these tests on the individual orders.   GOLD TOP - SST   GREEN TOP   LIGHT BLUE TOP     Medications   sodium chloride 0.9 % flush 10 mL (has no administration in time range)   sodium chloride 0.9 % bolus 500 mL (500 mL Intravenous New Bag 9/19/21 1609)   nitroglycerin (NITROSTAT) ointment 1 inch (1 inch Topical Given 9/19/21 1605)   aspirin chewable tablet 324 mg (324 mg Oral Given 9/19/21 1605)                                          MDM  Number of Diagnoses or Management Options  Chest pain, unspecified type  Diagnosis management comments: I examined the patient using the appropriate personal protective equipment.      DISPOSITION:   Chart Review:  Comorbidity:  has a past medical history of COPD (chronic obstructive pulmonary disease) (CMS/[facility]), Hyperlipidemia, Hypertension, and Stroke (CMS/[facility]).  Differentials:this list is not all inclusive and does not constitute the entirety of considered causes --> cardiac chest pain, hypertensive urgency, pleuritic chest pain    Imaging: Was interpreted by physician and reviewed by myself:  XR Chest 1 View    Result Date: 9/19/2021  No acute cardiopulmonary process.  Electronically Signed By-Freida Schneider MD On:9/19/2021 3:40 PM This report was finalized on [id number] by  Freida Schneider MD.      Disposition/Treatment:  Patient is a 51-year-old female who presents to the ER with 5 days of chest pain and high blood pressure today.  Patient is given Nitropaste and aspirin IV fluids with resolution of all of her  symptoms.  I did recommend that she stay for admittance in cardiac rule out.  Patient did not want sedation was to follow-up in the outpatient setting.  She reports that she does not like hospitals.  I did recommend that she stay and she refused.  Patient will be given cardiology referral.         Amount and/or Complexity of Data Reviewed  Clinical lab tests: reviewed  Tests in the radiology section of CPT®: reviewed  Tests in the medicine section of CPT®: reviewed    Patient Progress  Patient progress: improved      Final diagnoses:   Chest pain, unspecified type       ED Disposition  ED Disposition     ED Disposition Condition Comment    Zari Calle PA  [address] [place] [address]  [phone]    Call in 1 day  As needed, For further management    Prabhu Duke MD  [address] [facility] [place] [facility]  [phone]    Schedule an appointment as soon as possible for a visit            Medication List      Stop    azithromycin 250 MG tablet  Commonly known as: ZITHROMAX             Marah Pearce PA-C  09/19/21 7527

## 2021-09-22 LAB — QT INTERVAL: 348 MS

## 2021-09-23 ENCOUNTER — OFFICE VISIT (OUTPATIENT)
Dept: CARDIOLOGY | Facility: CLINIC | Age: 51
End: 2021-09-23

## 2021-09-23 VITALS
DIASTOLIC BLOOD PRESSURE: 95 MMHG | BODY MASS INDEX: 28.7 KG/M2 | WEIGHT: 124 LBS | HEIGHT: 55 IN | HEART RATE: 70 BPM | SYSTOLIC BLOOD PRESSURE: 156 MMHG

## 2021-09-23 DIAGNOSIS — R07.9 CHEST PAIN, UNSPECIFIED TYPE: Primary | ICD-10-CM

## 2021-09-23 DIAGNOSIS — R06.02 SOB (SHORTNESS OF BREATH): ICD-10-CM

## 2021-09-23 DIAGNOSIS — R00.2 PALPITATIONS: ICD-10-CM

## 2021-09-23 DIAGNOSIS — E78.41 ELEVATED LIPOPROTEIN(A): ICD-10-CM

## 2021-09-23 PROCEDURE — 99203 OFFICE O/P NEW LOW 30 MIN: CPT | Performed by: INTERNAL MEDICINE

## 2021-09-23 RX ORDER — ASPIRIN 81 MG/1
81 TABLET ORAL DAILY
COMMUNITY

## 2021-09-23 RX ORDER — NITROGLYCERIN 0.4 MG/1
TABLET SUBLINGUAL
Qty: 30 TABLET | Refills: 2 | Status: SHIPPED | OUTPATIENT
Start: 2021-09-23 | End: 2021-09-30

## 2021-09-23 NOTE — PROGRESS NOTES
"Cardiology Office Visit      Encounter Date:  09/23/2021    Patient ID:   Kamini Graham is a 51 y.o. female.    Reason For Followup:  Chest pain  Palpitations  Shortness of breath    Brief Clinical History:  Dear Dr. Church, ARNAUD Rios    I had the pleasure of seeing Kamini Graham today. As you are well aware, this is a 51 y.o. female the past medical history that is significant for history of tobacco abuse history of hypertension hyperlipidemia strong family history of coronary artery disease and premature coronary artery disease presented to the office with symptoms of shortness of breath chest pain palpitations    Interval History:  Patient was seen in the emergency room for similar symptoms  Complaining of worsening symptoms in the last several weeks  Complaining of symptoms of chest discomfort shortness of breath anxiety palpitations  Denies any syncope  No orthopnea no PND  Strong family history of coronary artery disease and premature coronary artery disease  History of tobacco abuse  Noted recent cardiac evaluation or work-up    Assessment & Plan    Impressions:  Chest pain  Palpitations  Shortness of breath  Ventricular ectopy  Family history of coronary artery disease and premature coronary artery disease  Tobacco abuse  Ventricular bigeminy    Recommendations:  Schedule patient for an echocardiogram to assess LV systolic function rule out significant myocardial dysfunction or valve pathology  Schedule patient for myocardial perfusion study for further stratification for symptoms of chest discomfort  Schedule patient for a 48-hour Holter monitor to further assess the ventricular ectopy  Recent labs and work-up that was done in the emergency room reviewed and discussed with patient  Sublingual nitroglycerin as needed for chest pain  Follow-up in office in 1 month    Objective:    Vitals:  Vitals:    09/23/21 1538   BP: 156/95   Pulse: 70   Weight: 56.2 kg (124 lb)   Height: 62 cm (24.41\") "       Physical Exam:    General: Alert, cooperative, no distress, appears stated age  Head:  Normocephalic, atraumatic, mucous membranes moist  Eyes:  Conjunctiva/corneas clear, EOM's intact     Neck:  Supple,  no adenopathy;      Lungs: Clear to auscultation bilaterally, no wheezes rhonchi rales are noted  Chest wall: No tenderness  Heart::  Regular rate and rhythm, S1 and S2 normal, no murmur, rub or gallop  Abdomen: Soft, non-tender, nondistended bowel sounds active  Extremities: No cyanosis, clubbing, or edema  Pulses: 2+ and symmetric all extremities  Skin:  No rashes or lesions  Neuro/psych: A&O x3. CN II through XII are grossly intact with appropriate affect      Allergies:  Allergies   Allergen Reactions   • Iodinated Diagnostic Agents Anaphylaxis   • Methylprednisolone Palpitations   • Prednisone Palpitations       Medication Review:     Current Outpatient Medications:   •  aspirin 81 MG EC tablet, Take 81 mg by mouth Daily., Disp: , Rfl:   •  HYDROcodone-acetaminophen (NORCO)  MG per tablet, Take 1 tablet by mouth Every 6 (Six) Hours As Needed for Moderate Pain ., Disp: , Rfl:   •  losartan (COZAAR) 25 MG tablet, Take 25 mg by mouth Daily., Disp: , Rfl:   •  raNITIdine (ZANTAC) 150 MG tablet, Take 150 mg by mouth 2 (Two) Times a Day., Disp: , Rfl:   •  nitroglycerin (NITROSTAT) 0.4 MG SL tablet, 1 under the tongue as needed for angina, may repeat q5mins for up three doses, Disp: 30 tablet, Rfl: 2    Family History:  History reviewed. No pertinent family history.    Past Medical History:  Past Medical History:   Diagnosis Date   • COPD (chronic obstructive pulmonary disease) (CMS/[facility])    • Hyperlipidemia    • Hypertension    • Stroke (CMS/[facility])        Past surgical History:  Past Surgical History:   Procedure Laterality Date   • HYSTERECTOMY         Social History:  Social History     Socioeconomic History   • Marital status:      Spouse name: Not on file   • Number of children: Not on file   •  Years of education: Not on file   • Highest education level: Not on file   Tobacco Use   • Smoking status: Current Every Day Smoker     Packs/day: 2.00     Types: Cigarettes   • Smokeless tobacco: Never Used   Substance and Sexual Activity   • Alcohol use: Yes     Alcohol/week: 3.0 standard drinks     Types: 3 Shots of liquor per week   • Drug use: No   • Sexual activity: Yes       Review of Systems:  The following systems were reviewed as they relate to the cardiovascular system: Constitutional, Eyes, ENT, Cardiovascular, Respiratory, Gastrointestinal, Integumentary, Neurological, Psychiatric, Hematologic, Endocrine, Musculoskeletal, and Genitourinary. The pertinent cardiovascular findings are reported above with all other cardiovascular points within those systems being negative.    Diagnostic Study Review:     Current Electrocardiogram:  Procedures      NOTE: The following portions of the patient's history were reviewed and updated this visit as appropriate: allergies, current medications, past family history, past medical history, past social history, past surgical history and problem list.

## 2021-09-29 ENCOUNTER — HOSPITAL ENCOUNTER (OUTPATIENT)
Facility: HOSPITAL | Age: 51
Setting detail: OBSERVATION
Discharge: LEFT BEFORE DISCHARGE | End: 2021-09-30
Attending: NURSE PRACTITIONER | Admitting: INTERNAL MEDICINE

## 2021-09-29 ENCOUNTER — TELEPHONE (OUTPATIENT)
Dept: CARDIOLOGY | Facility: CLINIC | Age: 51
End: 2021-09-29

## 2021-09-29 ENCOUNTER — APPOINTMENT (OUTPATIENT)
Dept: GENERAL RADIOLOGY | Facility: HOSPITAL | Age: 51
End: 2021-09-29

## 2021-09-29 DIAGNOSIS — R07.9 CHEST PAIN, UNSPECIFIED TYPE: Primary | ICD-10-CM

## 2021-09-29 LAB
ALBUMIN SERPL-MCNC: 4.8 G/DL (ref 3.5–5.2)
ALBUMIN/GLOB SERPL: 1.8 G/DL
ALP SERPL-CCNC: 88 U/L (ref 39–117)
ALT SERPL W P-5'-P-CCNC: 12 U/L (ref 1–33)
ANION GAP SERPL CALCULATED.3IONS-SCNC: 17 MMOL/L (ref 5–15)
AST SERPL-CCNC: 18 U/L (ref 1–32)
BASOPHILS # BLD AUTO: 0 10*3/MM3 (ref 0–0.2)
BASOPHILS # BLD AUTO: 0.1 10*3/MM3 (ref 0–0.2)
BASOPHILS NFR BLD AUTO: 0.4 % (ref 0–1.5)
BASOPHILS NFR BLD AUTO: 0.8 % (ref 0–1.5)
BILIRUB SERPL-MCNC: 0.5 MG/DL (ref 0–1.2)
BILIRUB UR QL STRIP: NEGATIVE
BUN SERPL-MCNC: 12 MG/DL (ref 6–20)
BUN/CREAT SERPL: 19.4 (ref 7–25)
CALCIUM SPEC-SCNC: 9.5 MG/DL (ref 8.6–10.5)
CHLORIDE SERPL-SCNC: 99 MMOL/L (ref 98–107)
CLARITY UR: CLEAR
CO2 SERPL-SCNC: 22 MMOL/L (ref 22–29)
COLOR UR: YELLOW
CREAT SERPL-MCNC: 0.62 MG/DL (ref 0.57–1)
DEPRECATED RDW RBC AUTO: 47.7 FL (ref 37–54)
DEPRECATED RDW RBC AUTO: 47.7 FL (ref 37–54)
EOSINOPHIL # BLD AUTO: 0 10*3/MM3 (ref 0–0.4)
EOSINOPHIL # BLD AUTO: 0.1 10*3/MM3 (ref 0–0.4)
EOSINOPHIL NFR BLD AUTO: 0.2 % (ref 0.3–6.2)
EOSINOPHIL NFR BLD AUTO: 0.7 % (ref 0.3–6.2)
ERYTHROCYTE [DISTWIDTH] IN BLOOD BY AUTOMATED COUNT: 13 % (ref 12.3–15.4)
ERYTHROCYTE [DISTWIDTH] IN BLOOD BY AUTOMATED COUNT: 13 % (ref 12.3–15.4)
GFR SERPL CREATININE-BSD FRML MDRD: 101 ML/MIN/1.73
GLOBULIN UR ELPH-MCNC: 2.7 GM/DL
GLUCOSE SERPL-MCNC: 109 MG/DL (ref 65–99)
GLUCOSE UR STRIP-MCNC: NEGATIVE MG/DL
HCT VFR BLD AUTO: 39.2 % (ref 34–46.6)
HCT VFR BLD AUTO: 41.6 % (ref 34–46.6)
HGB BLD-MCNC: 13.9 G/DL (ref 12–15.9)
HGB BLD-MCNC: 14.9 G/DL (ref 12–15.9)
HGB UR QL STRIP.AUTO: NEGATIVE
HOLD SPECIMEN: NORMAL
KETONES UR QL STRIP: ABNORMAL
LEUKOCYTE ESTERASE UR QL STRIP.AUTO: NEGATIVE
LYMPHOCYTES # BLD AUTO: 1.9 10*3/MM3 (ref 0.7–3.1)
LYMPHOCYTES # BLD AUTO: 2.1 10*3/MM3 (ref 0.7–3.1)
LYMPHOCYTES NFR BLD AUTO: 20.5 % (ref 19.6–45.3)
LYMPHOCYTES NFR BLD AUTO: 25.5 % (ref 19.6–45.3)
MAGNESIUM SERPL-MCNC: 1.8 MG/DL (ref 1.6–2.6)
MCH RBC QN AUTO: 37.8 PG (ref 26.6–33)
MCH RBC QN AUTO: 38 PG (ref 26.6–33)
MCHC RBC AUTO-ENTMCNC: 35.4 G/DL (ref 31.5–35.7)
MCHC RBC AUTO-ENTMCNC: 35.8 G/DL (ref 31.5–35.7)
MCV RBC AUTO: 106.1 FL (ref 79–97)
MCV RBC AUTO: 106.8 FL (ref 79–97)
MONOCYTES # BLD AUTO: 0.3 10*3/MM3 (ref 0.1–0.9)
MONOCYTES # BLD AUTO: 0.5 10*3/MM3 (ref 0.1–0.9)
MONOCYTES NFR BLD AUTO: 4.5 % (ref 5–12)
MONOCYTES NFR BLD AUTO: 5.1 % (ref 5–12)
NEUTROPHILS NFR BLD AUTO: 5.1 10*3/MM3 (ref 1.7–7)
NEUTROPHILS NFR BLD AUTO: 69 % (ref 42.7–76)
NEUTROPHILS NFR BLD AUTO: 7.5 10*3/MM3 (ref 1.7–7)
NEUTROPHILS NFR BLD AUTO: 73.3 % (ref 42.7–76)
NITRITE UR QL STRIP: NEGATIVE
NRBC BLD AUTO-RTO: 0 /100 WBC (ref 0–0.2)
NRBC BLD AUTO-RTO: 0 /100 WBC (ref 0–0.2)
NT-PROBNP SERPL-MCNC: 128.8 PG/ML (ref 0–900)
PH UR STRIP.AUTO: 6 [PH] (ref 5–8)
PHOSPHATE SERPL-MCNC: 3.3 MG/DL (ref 2.5–4.5)
PLAT MORPH BLD: NORMAL
PLATELET # BLD AUTO: 279 10*3/MM3 (ref 140–450)
PLATELET # BLD AUTO: 292 10*3/MM3 (ref 140–450)
PMV BLD AUTO: 8.3 FL (ref 6–12)
PMV BLD AUTO: 8.4 FL (ref 6–12)
POTASSIUM SERPL-SCNC: 3.9 MMOL/L (ref 3.5–5.2)
PROT SERPL-MCNC: 7.5 G/DL (ref 6–8.5)
PROT UR QL STRIP: NEGATIVE
RBC # BLD AUTO: 3.67 10*6/MM3 (ref 3.77–5.28)
RBC # BLD AUTO: 3.92 10*6/MM3 (ref 3.77–5.28)
RBC MORPH BLD: NORMAL
SARS-COV-2 RNA PNL SPEC NAA+PROBE: NOT DETECTED
SODIUM SERPL-SCNC: 138 MMOL/L (ref 136–145)
SP GR UR STRIP: 1.01 (ref 1–1.03)
TROPONIN T SERPL-MCNC: <0.01 NG/ML (ref 0–0.03)
TSH SERPL DL<=0.05 MIU/L-ACNC: 0.95 UIU/ML (ref 0.27–4.2)
UROBILINOGEN UR QL STRIP: ABNORMAL
WBC # BLD AUTO: 10.3 10*3/MM3 (ref 3.4–10.8)
WBC # BLD AUTO: 7.4 10*3/MM3 (ref 3.4–10.8)
WBC MORPH BLD: NORMAL
WHOLE BLOOD HOLD SPECIMEN: NORMAL
WHOLE BLOOD HOLD SPECIMEN: NORMAL

## 2021-09-29 PROCEDURE — 80307 DRUG TEST PRSMV CHEM ANLYZR: CPT | Performed by: NURSE PRACTITIONER

## 2021-09-29 PROCEDURE — 96372 THER/PROPH/DIAG INJ SC/IM: CPT

## 2021-09-29 PROCEDURE — 85025 COMPLETE CBC W/AUTO DIFF WBC: CPT | Performed by: INTERNAL MEDICINE

## 2021-09-29 PROCEDURE — 93005 ELECTROCARDIOGRAM TRACING: CPT

## 2021-09-29 PROCEDURE — 99213 OFFICE O/P EST LOW 20 MIN: CPT | Performed by: INTERNAL MEDICINE

## 2021-09-29 PROCEDURE — 87635 SARS-COV-2 COVID-19 AMP PRB: CPT | Performed by: NURSE PRACTITIONER

## 2021-09-29 PROCEDURE — 93005 ELECTROCARDIOGRAM TRACING: CPT | Performed by: NURSE PRACTITIONER

## 2021-09-29 PROCEDURE — 83735 ASSAY OF MAGNESIUM: CPT | Performed by: INTERNAL MEDICINE

## 2021-09-29 PROCEDURE — 84100 ASSAY OF PHOSPHORUS: CPT | Performed by: INTERNAL MEDICINE

## 2021-09-29 PROCEDURE — G0378 HOSPITAL OBSERVATION PER HR: HCPCS

## 2021-09-29 PROCEDURE — 85007 BL SMEAR W/DIFF WBC COUNT: CPT

## 2021-09-29 PROCEDURE — 81003 URINALYSIS AUTO W/O SCOPE: CPT | Performed by: NURSE PRACTITIONER

## 2021-09-29 PROCEDURE — 83880 ASSAY OF NATRIURETIC PEPTIDE: CPT | Performed by: INTERNAL MEDICINE

## 2021-09-29 PROCEDURE — 71045 X-RAY EXAM CHEST 1 VIEW: CPT

## 2021-09-29 PROCEDURE — 84484 ASSAY OF TROPONIN QUANT: CPT | Performed by: INTERNAL MEDICINE

## 2021-09-29 PROCEDURE — 96366 THER/PROPH/DIAG IV INF ADDON: CPT

## 2021-09-29 PROCEDURE — 25010000002 HEPARIN (PORCINE) PER 1000 UNITS: Performed by: INTERNAL MEDICINE

## 2021-09-29 PROCEDURE — 99285 EMERGENCY DEPT VISIT HI MDM: CPT

## 2021-09-29 PROCEDURE — 80050 GENERAL HEALTH PANEL: CPT

## 2021-09-29 PROCEDURE — 99220 PR INITIAL OBSERVATION CARE/DAY 70 MINUTES: CPT | Performed by: INTERNAL MEDICINE

## 2021-09-29 PROCEDURE — C9803 HOPD COVID-19 SPEC COLLECT: HCPCS

## 2021-09-29 PROCEDURE — 96365 THER/PROPH/DIAG IV INF INIT: CPT

## 2021-09-29 PROCEDURE — 84484 ASSAY OF TROPONIN QUANT: CPT

## 2021-09-29 RX ORDER — ONDANSETRON 4 MG/1
4 TABLET, FILM COATED ORAL EVERY 6 HOURS PRN
Status: DISCONTINUED | OUTPATIENT
Start: 2021-09-29 | End: 2021-09-30 | Stop reason: HOSPADM

## 2021-09-29 RX ORDER — LOSARTAN POTASSIUM 50 MG/1
50 TABLET ORAL DAILY
Status: DISCONTINUED | OUTPATIENT
Start: 2021-09-29 | End: 2021-09-30 | Stop reason: HOSPADM

## 2021-09-29 RX ORDER — ASPIRIN 81 MG/1
81 TABLET ORAL DAILY
Status: DISCONTINUED | OUTPATIENT
Start: 2021-09-30 | End: 2021-09-30 | Stop reason: HOSPADM

## 2021-09-29 RX ORDER — PANTOPRAZOLE SODIUM 40 MG/1
40 TABLET, DELAYED RELEASE ORAL DAILY
COMMUNITY

## 2021-09-29 RX ORDER — ATORVASTATIN CALCIUM 40 MG/1
80 TABLET, FILM COATED ORAL NIGHTLY
Status: DISCONTINUED | OUTPATIENT
Start: 2021-09-29 | End: 2021-09-30 | Stop reason: HOSPADM

## 2021-09-29 RX ORDER — HYDROCODONE BITARTRATE AND ACETAMINOPHEN 10; 325 MG/1; MG/1
1 TABLET ORAL EVERY 6 HOURS PRN
Status: DISCONTINUED | OUTPATIENT
Start: 2021-09-29 | End: 2021-09-30 | Stop reason: HOSPADM

## 2021-09-29 RX ORDER — CLOPIDOGREL BISULFATE 75 MG/1
600 TABLET ORAL ONCE
Status: COMPLETED | OUTPATIENT
Start: 2021-09-29 | End: 2021-09-29

## 2021-09-29 RX ORDER — SODIUM CHLORIDE 0.9 % (FLUSH) 0.9 %
10 SYRINGE (ML) INJECTION AS NEEDED
Status: DISCONTINUED | OUTPATIENT
Start: 2021-09-29 | End: 2021-09-30 | Stop reason: HOSPADM

## 2021-09-29 RX ORDER — ACETAMINOPHEN 325 MG/1
325 TABLET ORAL EVERY 4 HOURS PRN
Status: DISCONTINUED | OUTPATIENT
Start: 2021-09-29 | End: 2021-09-30 | Stop reason: HOSPADM

## 2021-09-29 RX ORDER — NICOTINE 21 MG/24HR
1 PATCH, TRANSDERMAL 24 HOURS TRANSDERMAL EVERY 24 HOURS
Status: DISCONTINUED | OUTPATIENT
Start: 2021-09-29 | End: 2021-09-30 | Stop reason: HOSPADM

## 2021-09-29 RX ORDER — ACETAMINOPHEN 160 MG/5ML
325 SOLUTION ORAL EVERY 4 HOURS PRN
Status: DISCONTINUED | OUTPATIENT
Start: 2021-09-29 | End: 2021-09-30 | Stop reason: HOSPADM

## 2021-09-29 RX ORDER — METOPROLOL SUCCINATE 50 MG/1
50 TABLET, EXTENDED RELEASE ORAL
Status: DISCONTINUED | OUTPATIENT
Start: 2021-09-29 | End: 2021-09-30

## 2021-09-29 RX ORDER — CLOPIDOGREL BISULFATE 75 MG/1
75 TABLET ORAL DAILY
Status: DISCONTINUED | OUTPATIENT
Start: 2021-09-30 | End: 2021-09-30 | Stop reason: HOSPADM

## 2021-09-29 RX ORDER — ACETAMINOPHEN 650 MG/1
325 SUPPOSITORY RECTAL EVERY 4 HOURS PRN
Status: DISCONTINUED | OUTPATIENT
Start: 2021-09-29 | End: 2021-09-30 | Stop reason: HOSPADM

## 2021-09-29 RX ORDER — HEPARIN SODIUM 5000 [USP'U]/ML
5000 INJECTION, SOLUTION INTRAVENOUS; SUBCUTANEOUS EVERY 12 HOURS SCHEDULED
Status: DISCONTINUED | OUTPATIENT
Start: 2021-09-29 | End: 2021-09-30 | Stop reason: HOSPADM

## 2021-09-29 RX ORDER — HYDROCODONE BITARTRATE AND ACETAMINOPHEN 10; 325 MG/1; MG/1
1 TABLET ORAL EVERY 6 HOURS PRN
Status: DISCONTINUED | OUTPATIENT
Start: 2021-09-29 | End: 2021-09-29 | Stop reason: SDUPTHER

## 2021-09-29 RX ORDER — ATORVASTATIN CALCIUM 80 MG/1
80 TABLET, FILM COATED ORAL NIGHTLY
COMMUNITY
Start: 2016-09-15

## 2021-09-29 RX ORDER — NITROGLYCERIN 20 MG/100ML
5-200 INJECTION INTRAVENOUS
Status: DISCONTINUED | OUTPATIENT
Start: 2021-09-29 | End: 2021-09-30

## 2021-09-29 RX ORDER — NITROGLYCERIN 0.4 MG/1
0.4 TABLET SUBLINGUAL
Status: DISCONTINUED | OUTPATIENT
Start: 2021-09-29 | End: 2021-09-30 | Stop reason: HOSPADM

## 2021-09-29 RX ORDER — IPRATROPIUM BROMIDE AND ALBUTEROL SULFATE 2.5; .5 MG/3ML; MG/3ML
3 SOLUTION RESPIRATORY (INHALATION) EVERY 4 HOURS PRN
Status: DISCONTINUED | OUTPATIENT
Start: 2021-09-29 | End: 2021-09-30 | Stop reason: HOSPADM

## 2021-09-29 RX ORDER — ONDANSETRON 2 MG/ML
4 INJECTION INTRAMUSCULAR; INTRAVENOUS EVERY 6 HOURS PRN
Status: DISCONTINUED | OUTPATIENT
Start: 2021-09-29 | End: 2021-09-30 | Stop reason: HOSPADM

## 2021-09-29 RX ORDER — FAMOTIDINE 20 MG/1
20 TABLET, FILM COATED ORAL
Status: DISCONTINUED | OUTPATIENT
Start: 2021-09-30 | End: 2021-09-30 | Stop reason: HOSPADM

## 2021-09-29 RX ORDER — SODIUM CHLORIDE 0.9 % (FLUSH) 0.9 %
10 SYRINGE (ML) INJECTION EVERY 12 HOURS SCHEDULED
Status: DISCONTINUED | OUTPATIENT
Start: 2021-09-29 | End: 2021-09-30 | Stop reason: HOSPADM

## 2021-09-29 RX ORDER — PANTOPRAZOLE SODIUM 40 MG/1
40 TABLET, DELAYED RELEASE ORAL DAILY
Status: DISCONTINUED | OUTPATIENT
Start: 2021-09-30 | End: 2021-09-30 | Stop reason: HOSPADM

## 2021-09-29 RX ORDER — BUDESONIDE 0.5 MG/2ML
0.5 INHALANT ORAL
Status: DISCONTINUED | OUTPATIENT
Start: 2021-09-29 | End: 2021-09-30 | Stop reason: HOSPADM

## 2021-09-29 RX ADMIN — NITROGLYCERIN 35 MCG/MIN: 20 INJECTION INTRAVENOUS at 19:54

## 2021-09-29 RX ADMIN — CLOPIDOGREL BISULFATE 600 MG: 75 TABLET ORAL at 21:13

## 2021-09-29 RX ADMIN — METOPROLOL SUCCINATE 50 MG: 50 TABLET, EXTENDED RELEASE ORAL at 21:14

## 2021-09-29 RX ADMIN — HEPARIN SODIUM 5000 UNITS: 5000 INJECTION, SOLUTION INTRAVENOUS; SUBCUTANEOUS at 21:14

## 2021-09-29 RX ADMIN — ATORVASTATIN CALCIUM 80 MG: 40 TABLET, FILM COATED ORAL at 21:14

## 2021-09-29 RX ADMIN — NITROGLYCERIN 45 MCG/MIN: 20 INJECTION INTRAVENOUS at 21:17

## 2021-09-29 RX ADMIN — NITROGLYCERIN 10 MCG/MIN: 20 INJECTION INTRAVENOUS at 15:07

## 2021-09-29 RX ADMIN — NICOTINE 1 PATCH: 21 PATCH, EXTENDED RELEASE TRANSDERMAL at 21:15

## 2021-09-29 RX ADMIN — HYDROCODONE BITARTRATE AND ACETAMINOPHEN 1 TABLET: 10; 325 TABLET ORAL at 16:52

## 2021-09-29 RX ADMIN — LOSARTAN POTASSIUM 50 MG: 50 TABLET, FILM COATED ORAL at 21:14

## 2021-09-29 RX ADMIN — Medication 10 ML: at 21:14

## 2021-09-29 NOTE — TELEPHONE ENCOUNTER
Pt called and left a v/m stating she wasn't sure if she needed to take Nitro for an episode she had.    Called pt back, unable to reach, see in the chart she has gone to the ER today and has been admitted.

## 2021-09-30 ENCOUNTER — HOSPITAL ENCOUNTER (OUTPATIENT)
Facility: HOSPITAL | Age: 51
Setting detail: OBSERVATION
Discharge: HOME | End: 2021-10-01
Attending: INTERNAL MEDICINE | Admitting: INTERNAL MEDICINE

## 2021-09-30 ENCOUNTER — APPOINTMENT (OUTPATIENT)
Dept: GENERAL RADIOLOGY | Facility: HOSPITAL | Age: 51
End: 2021-09-30

## 2021-09-30 ENCOUNTER — APPOINTMENT (OUTPATIENT)
Dept: CARDIOLOGY | Facility: HOSPITAL | Age: 51
End: 2021-09-30

## 2021-09-30 VITALS
BODY MASS INDEX: 22.48 KG/M2 | WEIGHT: 122.14 LBS | HEART RATE: 62 BPM | TEMPERATURE: 97.7 F | RESPIRATION RATE: 16 BRPM | HEIGHT: 62 IN | OXYGEN SATURATION: 98 % | DIASTOLIC BLOOD PRESSURE: 94 MMHG | SYSTOLIC BLOOD PRESSURE: 160 MMHG

## 2021-09-30 DIAGNOSIS — R07.9 CHEST PAIN, UNSPECIFIED TYPE: Primary | ICD-10-CM

## 2021-09-30 PROBLEM — Z91.199 H/O NONCOMPLIANCE WITH MEDICAL TREATMENT, PRESENTING HAZARDS TO HEALTH: Chronic | Status: ACTIVE | Noted: 2021-09-30

## 2021-09-30 PROBLEM — G89.29 CHRONIC PAIN: Chronic | Status: ACTIVE | Noted: 2021-09-30

## 2021-09-30 PROBLEM — F41.9 ANXIETY DISORDER: Status: ACTIVE | Noted: 2021-09-30

## 2021-09-30 LAB
ALBUMIN SERPL-MCNC: 4.9 G/DL (ref 3.5–5.2)
ALBUMIN/GLOB SERPL: 1.9 G/DL
ALP SERPL-CCNC: 91 U/L (ref 39–117)
ALT SERPL W P-5'-P-CCNC: 10 U/L (ref 1–33)
AMPHET+METHAMPHET UR QL: NEGATIVE
ANION GAP SERPL CALCULATED.3IONS-SCNC: 16 MMOL/L (ref 5–15)
ANION GAP SERPL CALCULATED.3IONS-SCNC: 17 MMOL/L (ref 5–15)
APTT PPP: 28.6 SECONDS (ref 24–31)
AST SERPL-CCNC: 16 U/L (ref 1–32)
BARBITURATES UR QL SCN: NEGATIVE
BASOPHILS # BLD AUTO: 0 10*3/MM3 (ref 0–0.2)
BASOPHILS NFR BLD AUTO: 0.5 % (ref 0–1.5)
BENZODIAZ UR QL SCN: NEGATIVE
BILIRUB SERPL-MCNC: 0.9 MG/DL (ref 0–1.2)
BUN SERPL-MCNC: 11 MG/DL (ref 6–20)
BUN SERPL-MCNC: 9 MG/DL (ref 6–20)
BUN/CREAT SERPL: 13.8 (ref 7–25)
BUN/CREAT SERPL: 21.2 (ref 7–25)
CALCIUM SPEC-SCNC: 9.2 MG/DL (ref 8.6–10.5)
CALCIUM SPEC-SCNC: 9.8 MG/DL (ref 8.6–10.5)
CANNABINOIDS SERPL QL: NEGATIVE
CHLORIDE SERPL-SCNC: 100 MMOL/L (ref 98–107)
CHLORIDE SERPL-SCNC: 96 MMOL/L (ref 98–107)
CHOLEST SERPL-MCNC: 274 MG/DL (ref 0–200)
CO2 SERPL-SCNC: 22 MMOL/L (ref 22–29)
CO2 SERPL-SCNC: 22 MMOL/L (ref 22–29)
COCAINE UR QL: NEGATIVE
CREAT SERPL-MCNC: 0.52 MG/DL (ref 0.57–1)
CREAT SERPL-MCNC: 0.65 MG/DL (ref 0.57–1)
D DIMER PPP FEU-MCNC: 0.3 MG/L (FEU) (ref 0–0.59)
DEPRECATED RDW RBC AUTO: 47.7 FL (ref 37–54)
DEPRECATED RDW RBC AUTO: 48.6 FL (ref 37–54)
EOSINOPHIL # BLD AUTO: 0 10*3/MM3 (ref 0–0.4)
EOSINOPHIL NFR BLD AUTO: 0.3 % (ref 0.3–6.2)
ERYTHROCYTE [DISTWIDTH] IN BLOOD BY AUTOMATED COUNT: 13 % (ref 12.3–15.4)
ERYTHROCYTE [DISTWIDTH] IN BLOOD BY AUTOMATED COUNT: 13.2 % (ref 12.3–15.4)
GFR SERPL CREATININE-BSD FRML MDRD: 123 ML/MIN/1.73
GFR SERPL CREATININE-BSD FRML MDRD: 95 ML/MIN/1.73
GLOBULIN UR ELPH-MCNC: 2.6 GM/DL
GLUCOSE SERPL-MCNC: 102 MG/DL (ref 65–99)
GLUCOSE SERPL-MCNC: 107 MG/DL (ref 65–99)
HBA1C MFR BLD: 5.5 % (ref 3.5–5.6)
HCT VFR BLD AUTO: 38.7 % (ref 34–46.6)
HCT VFR BLD AUTO: 43.4 % (ref 34–46.6)
HDLC SERPL-MCNC: 48 MG/DL (ref 40–60)
HGB BLD-MCNC: 13.4 G/DL (ref 12–15.9)
HGB BLD-MCNC: 15.3 G/DL (ref 12–15.9)
HOLD SPECIMEN: NORMAL
INR PPP: 0.98 (ref 0.93–1.1)
LDLC SERPL CALC-MCNC: 171 MG/DL (ref 0–100)
LDLC/HDLC SERPL: 3.51 {RATIO}
LYMPHOCYTES # BLD AUTO: 1.7 10*3/MM3 (ref 0.7–3.1)
LYMPHOCYTES NFR BLD AUTO: 26.4 % (ref 19.6–45.3)
MCH RBC QN AUTO: 36.8 PG (ref 26.6–33)
MCH RBC QN AUTO: 36.8 PG (ref 26.6–33)
MCHC RBC AUTO-ENTMCNC: 34.7 G/DL (ref 31.5–35.7)
MCHC RBC AUTO-ENTMCNC: 35.3 G/DL (ref 31.5–35.7)
MCV RBC AUTO: 104.4 FL (ref 79–97)
MCV RBC AUTO: 106.1 FL (ref 79–97)
METHADONE UR QL SCN: NEGATIVE
MONOCYTES # BLD AUTO: 0.3 10*3/MM3 (ref 0.1–0.9)
MONOCYTES NFR BLD AUTO: 4.9 % (ref 5–12)
NEUTROPHILS NFR BLD AUTO: 4.3 10*3/MM3 (ref 1.7–7)
NEUTROPHILS NFR BLD AUTO: 67.9 % (ref 42.7–76)
NRBC BLD AUTO-RTO: 0.2 /100 WBC (ref 0–0.2)
OPIATES UR QL: POSITIVE
OXYCODONE UR QL SCN: NEGATIVE
PLATELET # BLD AUTO: 269 10*3/MM3 (ref 140–450)
PLATELET # BLD AUTO: 289 10*3/MM3 (ref 140–450)
PMV BLD AUTO: 8.2 FL (ref 6–12)
PMV BLD AUTO: 8.3 FL (ref 6–12)
POTASSIUM SERPL-SCNC: 3.4 MMOL/L (ref 3.5–5.2)
POTASSIUM SERPL-SCNC: 3.5 MMOL/L (ref 3.5–5.2)
PROT SERPL-MCNC: 7.5 G/DL (ref 6–8.5)
PROTHROMBIN TIME: 10.9 SECONDS (ref 9.6–11.7)
QT INTERVAL: 373 MS
RBC # BLD AUTO: 3.65 10*6/MM3 (ref 3.77–5.28)
RBC # BLD AUTO: 4.16 10*6/MM3 (ref 3.77–5.28)
SARS-COV-2 RNA PNL SPEC NAA+PROBE: NOT DETECTED
SODIUM SERPL-SCNC: 135 MMOL/L (ref 136–145)
SODIUM SERPL-SCNC: 138 MMOL/L (ref 136–145)
TRIGL SERPL-MCNC: 287 MG/DL (ref 0–150)
TROPONIN T SERPL-MCNC: <0.01 NG/ML (ref 0–0.03)
VLDLC SERPL-MCNC: 55 MG/DL (ref 5–40)
WBC # BLD AUTO: 6.4 10*3/MM3 (ref 3.4–10.8)
WBC # BLD AUTO: 6.8 10*3/MM3 (ref 3.4–10.8)

## 2021-09-30 PROCEDURE — 94640 AIRWAY INHALATION TREATMENT: CPT

## 2021-09-30 PROCEDURE — 84484 ASSAY OF TROPONIN QUANT: CPT | Performed by: PHYSICIAN ASSISTANT

## 2021-09-30 PROCEDURE — 71045 X-RAY EXAM CHEST 1 VIEW: CPT

## 2021-09-30 PROCEDURE — 80061 LIPID PANEL: CPT | Performed by: INTERNAL MEDICINE

## 2021-09-30 PROCEDURE — 84484 ASSAY OF TROPONIN QUANT: CPT | Performed by: NURSE PRACTITIONER

## 2021-09-30 PROCEDURE — 83036 HEMOGLOBIN GLYCOSYLATED A1C: CPT | Performed by: INTERNAL MEDICINE

## 2021-09-30 PROCEDURE — G0378 HOSPITAL OBSERVATION PER HR: HCPCS

## 2021-09-30 PROCEDURE — 99214 OFFICE O/P EST MOD 30 MIN: CPT | Performed by: INTERNAL MEDICINE

## 2021-09-30 PROCEDURE — 85025 COMPLETE CBC W/AUTO DIFF WBC: CPT | Performed by: PHYSICIAN ASSISTANT

## 2021-09-30 PROCEDURE — 25010000002 ENOXAPARIN PER 10 MG: Performed by: NURSE PRACTITIONER

## 2021-09-30 PROCEDURE — 93005 ELECTROCARDIOGRAM TRACING: CPT

## 2021-09-30 PROCEDURE — 85027 COMPLETE CBC AUTOMATED: CPT | Performed by: INTERNAL MEDICINE

## 2021-09-30 PROCEDURE — 93306 TTE W/DOPPLER COMPLETE: CPT

## 2021-09-30 PROCEDURE — U0003 INFECTIOUS AGENT DETECTION BY NUCLEIC ACID (DNA OR RNA); SEVERE ACUTE RESPIRATORY SYNDROME CORONAVIRUS 2 (SARS-COV-2) (CORONAVIRUS DISEASE [COVID-19]), AMPLIFIED PROBE TECHNIQUE, MAKING USE OF HIGH THROUGHPUT TECHNOLOGIES AS DESCRIBED BY CMS-2020-01-R: HCPCS | Performed by: PHYSICIAN ASSISTANT

## 2021-09-30 PROCEDURE — 84484 ASSAY OF TROPONIN QUANT: CPT | Performed by: INTERNAL MEDICINE

## 2021-09-30 PROCEDURE — 96372 THER/PROPH/DIAG INJ SC/IM: CPT

## 2021-09-30 PROCEDURE — 85379 FIBRIN DEGRADATION QUANT: CPT | Performed by: PHYSICIAN ASSISTANT

## 2021-09-30 PROCEDURE — C9803 HOPD COVID-19 SPEC COLLECT: HCPCS

## 2021-09-30 PROCEDURE — 99220 PR INITIAL OBSERVATION CARE/DAY 70 MINUTES: CPT | Performed by: INTERNAL MEDICINE

## 2021-09-30 PROCEDURE — 36415 COLL VENOUS BLD VENIPUNCTURE: CPT | Performed by: NURSE PRACTITIONER

## 2021-09-30 PROCEDURE — 93005 ELECTROCARDIOGRAM TRACING: CPT | Performed by: INTERNAL MEDICINE

## 2021-09-30 PROCEDURE — 93306 TTE W/DOPPLER COMPLETE: CPT | Performed by: INTERNAL MEDICINE

## 2021-09-30 PROCEDURE — 85730 THROMBOPLASTIN TIME PARTIAL: CPT | Performed by: PHYSICIAN ASSISTANT

## 2021-09-30 PROCEDURE — 85610 PROTHROMBIN TIME: CPT | Performed by: PHYSICIAN ASSISTANT

## 2021-09-30 PROCEDURE — 96366 THER/PROPH/DIAG IV INF ADDON: CPT

## 2021-09-30 PROCEDURE — 99285 EMERGENCY DEPT VISIT HI MDM: CPT

## 2021-09-30 PROCEDURE — 80053 COMPREHEN METABOLIC PANEL: CPT | Performed by: INTERNAL MEDICINE

## 2021-09-30 PROCEDURE — 93005 ELECTROCARDIOGRAM TRACING: CPT | Performed by: PHYSICIAN ASSISTANT

## 2021-09-30 RX ORDER — ASPIRIN 325 MG
325 TABLET, DELAYED RELEASE (ENTERIC COATED) ORAL DAILY
Status: DISCONTINUED | OUTPATIENT
Start: 2021-10-01 | End: 2021-10-01 | Stop reason: HOSPADM

## 2021-09-30 RX ORDER — METOPROLOL SUCCINATE 25 MG/1
25 TABLET, EXTENDED RELEASE ORAL
Status: DISCONTINUED | OUTPATIENT
Start: 2021-09-30 | End: 2021-09-30 | Stop reason: HOSPADM

## 2021-09-30 RX ORDER — ASPIRIN 81 MG/1
324 TABLET, CHEWABLE ORAL ONCE
Status: COMPLETED | OUTPATIENT
Start: 2021-09-30 | End: 2021-09-30

## 2021-09-30 RX ORDER — FENTANYL/ROPIVACAINE/NS/PF 2-625MCG/1
15 PLASTIC BAG, INJECTION (ML) EPIDURAL AS NEEDED
Status: DISCONTINUED | OUTPATIENT
Start: 2021-09-30 | End: 2021-09-30 | Stop reason: HOSPADM

## 2021-09-30 RX ORDER — POTASSIUM CHLORIDE 7.45 MG/ML
10 INJECTION INTRAVENOUS
Status: DISCONTINUED | OUTPATIENT
Start: 2021-09-30 | End: 2021-09-30 | Stop reason: HOSPADM

## 2021-09-30 RX ORDER — FENTANYL/ROPIVACAINE/NS/PF 2-625MCG/1
15 PLASTIC BAG, INJECTION (ML) EPIDURAL AS NEEDED
Status: DISCONTINUED | OUTPATIENT
Start: 2021-09-30 | End: 2021-09-30

## 2021-09-30 RX ORDER — SODIUM CHLORIDE 0.9 % (FLUSH) 0.9 %
10 SYRINGE (ML) INJECTION AS NEEDED
Status: DISCONTINUED | OUTPATIENT
Start: 2021-09-30 | End: 2021-10-01 | Stop reason: HOSPADM

## 2021-09-30 RX ORDER — ASPIRIN 81 MG/1
81 TABLET ORAL DAILY
Status: DISCONTINUED | OUTPATIENT
Start: 2021-09-30 | End: 2021-09-30

## 2021-09-30 RX ORDER — ONDANSETRON 4 MG/1
4 TABLET, FILM COATED ORAL EVERY 6 HOURS PRN
Status: DISCONTINUED | OUTPATIENT
Start: 2021-09-30 | End: 2021-10-01 | Stop reason: HOSPADM

## 2021-09-30 RX ORDER — MAGNESIUM SULFATE HEPTAHYDRATE 40 MG/ML
2 INJECTION, SOLUTION INTRAVENOUS AS NEEDED
Status: DISCONTINUED | OUTPATIENT
Start: 2021-09-30 | End: 2021-09-30 | Stop reason: HOSPADM

## 2021-09-30 RX ORDER — ACETAMINOPHEN 650 MG/1
650 SUPPOSITORY RECTAL EVERY 4 HOURS PRN
Status: DISCONTINUED | OUTPATIENT
Start: 2021-09-30 | End: 2021-10-01 | Stop reason: HOSPADM

## 2021-09-30 RX ORDER — ACETAMINOPHEN 160 MG/5ML
650 SOLUTION ORAL EVERY 4 HOURS PRN
Status: DISCONTINUED | OUTPATIENT
Start: 2021-09-30 | End: 2021-10-01 | Stop reason: HOSPADM

## 2021-09-30 RX ORDER — MAGNESIUM SULFATE HEPTAHYDRATE 40 MG/ML
2 INJECTION, SOLUTION INTRAVENOUS AS NEEDED
Status: DISCONTINUED | OUTPATIENT
Start: 2021-09-30 | End: 2021-10-01 | Stop reason: HOSPADM

## 2021-09-30 RX ORDER — ACETAMINOPHEN 325 MG/1
650 TABLET ORAL EVERY 4 HOURS PRN
Status: DISCONTINUED | OUTPATIENT
Start: 2021-09-30 | End: 2021-10-01 | Stop reason: HOSPADM

## 2021-09-30 RX ORDER — MAGNESIUM SULFATE HEPTAHYDRATE 40 MG/ML
4 INJECTION, SOLUTION INTRAVENOUS AS NEEDED
Status: DISCONTINUED | OUTPATIENT
Start: 2021-09-30 | End: 2021-10-01 | Stop reason: HOSPADM

## 2021-09-30 RX ORDER — POTASSIUM CHLORIDE 750 MG/1
10 TABLET, FILM COATED, EXTENDED RELEASE ORAL ONCE
Status: COMPLETED | OUTPATIENT
Start: 2021-09-30 | End: 2021-09-30

## 2021-09-30 RX ORDER — SODIUM CHLORIDE 0.9 % (FLUSH) 0.9 %
10 SYRINGE (ML) INJECTION EVERY 12 HOURS SCHEDULED
Status: DISCONTINUED | OUTPATIENT
Start: 2021-09-30 | End: 2021-10-01 | Stop reason: HOSPADM

## 2021-09-30 RX ORDER — POTASSIUM CHLORIDE 750 MG/1
10 TABLET, FILM COATED, EXTENDED RELEASE ORAL ONCE
Status: DISCONTINUED | OUTPATIENT
Start: 2021-09-30 | End: 2021-09-30 | Stop reason: HOSPADM

## 2021-09-30 RX ORDER — POTASSIUM CHLORIDE 1.5 G/1.77G
40 POWDER, FOR SOLUTION ORAL AS NEEDED
Status: DISCONTINUED | OUTPATIENT
Start: 2021-09-30 | End: 2021-10-01 | Stop reason: HOSPADM

## 2021-09-30 RX ORDER — MAGNESIUM SULFATE HEPTAHYDRATE 40 MG/ML
4 INJECTION, SOLUTION INTRAVENOUS AS NEEDED
Status: DISCONTINUED | OUTPATIENT
Start: 2021-09-30 | End: 2021-09-30 | Stop reason: HOSPADM

## 2021-09-30 RX ORDER — HYDROXYZINE HYDROCHLORIDE 25 MG/1
25 TABLET, FILM COATED ORAL 3 TIMES DAILY PRN
Status: DISCONTINUED | OUTPATIENT
Start: 2021-09-30 | End: 2021-10-01 | Stop reason: HOSPADM

## 2021-09-30 RX ORDER — ONDANSETRON 2 MG/ML
4 INJECTION INTRAMUSCULAR; INTRAVENOUS EVERY 6 HOURS PRN
Status: DISCONTINUED | OUTPATIENT
Start: 2021-09-30 | End: 2021-10-01 | Stop reason: HOSPADM

## 2021-09-30 RX ORDER — NITROGLYCERIN 0.4 MG/1
0.4 TABLET SUBLINGUAL
Status: DISCONTINUED | OUTPATIENT
Start: 2021-09-30 | End: 2021-10-01 | Stop reason: HOSPADM

## 2021-09-30 RX ORDER — ALUMINA, MAGNESIA, AND SIMETHICONE 2400; 2400; 240 MG/30ML; MG/30ML; MG/30ML
15 SUSPENSION ORAL EVERY 6 HOURS PRN
Status: DISCONTINUED | OUTPATIENT
Start: 2021-09-30 | End: 2021-10-01 | Stop reason: HOSPADM

## 2021-09-30 RX ORDER — IPRATROPIUM BROMIDE AND ALBUTEROL SULFATE 2.5; .5 MG/3ML; MG/3ML
3 SOLUTION RESPIRATORY (INHALATION) EVERY 4 HOURS PRN
Status: DISCONTINUED | OUTPATIENT
Start: 2021-09-30 | End: 2021-10-01 | Stop reason: HOSPADM

## 2021-09-30 RX ORDER — CALCIUM GLUCONATE 20 MG/ML
2 INJECTION, SOLUTION INTRAVENOUS AS NEEDED
Status: DISCONTINUED | OUTPATIENT
Start: 2021-09-30 | End: 2021-09-30 | Stop reason: HOSPADM

## 2021-09-30 RX ORDER — POTASSIUM CHLORIDE 20 MEQ/1
40 TABLET, EXTENDED RELEASE ORAL AS NEEDED
Status: DISCONTINUED | OUTPATIENT
Start: 2021-09-30 | End: 2021-10-01 | Stop reason: HOSPADM

## 2021-09-30 RX ORDER — LOSARTAN POTASSIUM 25 MG/1
25 TABLET ORAL DAILY
Status: DISCONTINUED | OUTPATIENT
Start: 2021-09-30 | End: 2021-10-01 | Stop reason: HOSPADM

## 2021-09-30 RX ORDER — CALCIUM GLUCONATE 20 MG/ML
1 INJECTION, SOLUTION INTRAVENOUS AS NEEDED
Status: DISCONTINUED | OUTPATIENT
Start: 2021-09-30 | End: 2021-09-30 | Stop reason: HOSPADM

## 2021-09-30 RX ORDER — CHOLECALCIFEROL (VITAMIN D3) 125 MCG
5 CAPSULE ORAL NIGHTLY PRN
Status: DISCONTINUED | OUTPATIENT
Start: 2021-09-30 | End: 2021-10-01 | Stop reason: HOSPADM

## 2021-09-30 RX ORDER — LOSARTAN POTASSIUM AND HYDROCHLOROTHIAZIDE 12.5; 5 MG/1; MG/1
1 TABLET ORAL DAILY
COMMUNITY

## 2021-09-30 RX ORDER — HYDROCODONE BITARTRATE AND ACETAMINOPHEN 10; 325 MG/1; MG/1
1 TABLET ORAL EVERY 4 HOURS PRN
Status: DISCONTINUED | OUTPATIENT
Start: 2021-09-30 | End: 2021-10-01 | Stop reason: HOSPADM

## 2021-09-30 RX ORDER — ATORVASTATIN CALCIUM 40 MG/1
80 TABLET, FILM COATED ORAL NIGHTLY
Status: DISCONTINUED | OUTPATIENT
Start: 2021-09-30 | End: 2021-10-01 | Stop reason: HOSPADM

## 2021-09-30 RX ORDER — PANTOPRAZOLE SODIUM 40 MG/1
40 TABLET, DELAYED RELEASE ORAL EVERY MORNING
Status: DISCONTINUED | OUTPATIENT
Start: 2021-10-01 | End: 2021-10-01 | Stop reason: HOSPADM

## 2021-09-30 RX ORDER — NICOTINE 21 MG/24HR
1 PATCH, TRANSDERMAL 24 HOURS TRANSDERMAL
Status: DISCONTINUED | OUTPATIENT
Start: 2021-09-30 | End: 2021-10-01 | Stop reason: HOSPADM

## 2021-09-30 RX ADMIN — LOSARTAN POTASSIUM 25 MG: 25 TABLET, FILM COATED ORAL at 17:55

## 2021-09-30 RX ADMIN — Medication 10 ML: at 21:19

## 2021-09-30 RX ADMIN — NICOTINE 1 PATCH: 21 PATCH, EXTENDED RELEASE TRANSDERMAL at 16:18

## 2021-09-30 RX ADMIN — HYDROCODONE BITARTRATE AND ACETAMINOPHEN 1 TABLET: 10; 325 TABLET ORAL at 08:37

## 2021-09-30 RX ADMIN — HYDROCODONE BITARTRATE AND ACETAMINOPHEN 1 TABLET: 10; 325 TABLET ORAL at 22:15

## 2021-09-30 RX ADMIN — HYDROCODONE BITARTRATE AND ACETAMINOPHEN 1 TABLET: 10; 325 TABLET ORAL at 02:19

## 2021-09-30 RX ADMIN — ASPIRIN 81 MG CHEWABLE TABLET 324 MG: 81 TABLET CHEWABLE at 15:32

## 2021-09-30 RX ADMIN — ENOXAPARIN SODIUM 40 MG: 40 INJECTION SUBCUTANEOUS at 17:54

## 2021-09-30 RX ADMIN — NITROGLYCERIN 1 INCH: 20 OINTMENT TOPICAL at 15:32

## 2021-09-30 RX ADMIN — POTASSIUM CHLORIDE 10 MEQ: 750 TABLET, EXTENDED RELEASE ORAL at 07:00

## 2021-09-30 RX ADMIN — ATORVASTATIN CALCIUM 80 MG: 40 TABLET, FILM COATED ORAL at 21:23

## 2021-09-30 RX ADMIN — HYDROCODONE BITARTRATE AND ACETAMINOPHEN 1 TABLET: 10; 325 TABLET ORAL at 17:54

## 2021-10-01 ENCOUNTER — APPOINTMENT (OUTPATIENT)
Dept: NUCLEAR MEDICINE | Facility: HOSPITAL | Age: 51
End: 2021-10-01

## 2021-10-01 VITALS
HEIGHT: 62 IN | TEMPERATURE: 97.9 F | HEART RATE: 81 BPM | WEIGHT: 118 LBS | BODY MASS INDEX: 21.71 KG/M2 | DIASTOLIC BLOOD PRESSURE: 87 MMHG | SYSTOLIC BLOOD PRESSURE: 127 MMHG | RESPIRATION RATE: 18 BRPM | OXYGEN SATURATION: 97 %

## 2021-10-01 LAB
ANION GAP SERPL CALCULATED.3IONS-SCNC: 16 MMOL/L (ref 5–15)
BASOPHILS # BLD AUTO: 0 10*3/MM3 (ref 0–0.2)
BASOPHILS NFR BLD AUTO: 0.5 % (ref 0–1.5)
BH CV ECHO MEAS - % IVS THICK: 45.9 %
BH CV ECHO MEAS - % LVPW THICK: 38.3 %
BH CV ECHO MEAS - ACS: 1.7 CM
BH CV ECHO MEAS - AO MAX PG (FULL): -0.03 MMHG
BH CV ECHO MEAS - AO MAX PG: 3.5 MMHG
BH CV ECHO MEAS - AO MEAN PG (FULL): 0.52 MMHG
BH CV ECHO MEAS - AO MEAN PG: 2.2 MMHG
BH CV ECHO MEAS - AO ROOT AREA (BSA CORRECTED): 1.8
BH CV ECHO MEAS - AO ROOT AREA: 6.2 CM^2
BH CV ECHO MEAS - AO ROOT DIAM: 2.8 CM
BH CV ECHO MEAS - AO V2 MAX: 93.9 CM/SEC
BH CV ECHO MEAS - AO V2 MEAN: 71.9 CM/SEC
BH CV ECHO MEAS - AO V2 VTI: 17.7 CM
BH CV ECHO MEAS - AVA(I,A): 2.2 CM^2
BH CV ECHO MEAS - AVA(I,D): 2.2 CM^2
BH CV ECHO MEAS - AVA(V,A): 2.5 CM^2
BH CV ECHO MEAS - AVA(V,D): 2.5 CM^2
BH CV ECHO MEAS - BSA(HAYCOCK): 1.5 M^2
BH CV ECHO MEAS - BSA: 1.5 M^2
BH CV ECHO MEAS - BZI_BMI: 21.6 KILOGRAMS/M^2
BH CV ECHO MEAS - BZI_METRIC_HEIGHT: 157.5 CM
BH CV ECHO MEAS - BZI_METRIC_WEIGHT: 53.5 KG
BH CV ECHO MEAS - EDV(CUBED): 78.7 ML
BH CV ECHO MEAS - EDV(MOD-SP4): 38.3 ML
BH CV ECHO MEAS - EDV(TEICH): 82.4 ML
BH CV ECHO MEAS - EF(CUBED): 82.1 %
BH CV ECHO MEAS - EF(MOD-BP): 65 %
BH CV ECHO MEAS - EF(MOD-SP4): 65 %
BH CV ECHO MEAS - EF(TEICH): 75.2 %
BH CV ECHO MEAS - ESV(CUBED): 14.1 ML
BH CV ECHO MEAS - ESV(MOD-SP4): 13.4 ML
BH CV ECHO MEAS - ESV(TEICH): 20.4 ML
BH CV ECHO MEAS - FS: 43.7 %
BH CV ECHO MEAS - IVS/LVPW: 1.1
BH CV ECHO MEAS - IVSD: 1 CM
BH CV ECHO MEAS - IVSS: 1.5 CM
BH CV ECHO MEAS - LA DIMENSION(2D): 2.4 CM
BH CV ECHO MEAS - LV DIASTOLIC VOL/BSA (35-75): 25.1 ML/M^2
BH CV ECHO MEAS - LV MASS(C)D: 138 GRAMS
BH CV ECHO MEAS - LV MASS(C)DI: 90.3 GRAMS/M^2
BH CV ECHO MEAS - LV MASS(C)S: 106.1 GRAMS
BH CV ECHO MEAS - LV MASS(C)SI: 69.5 GRAMS/M^2
BH CV ECHO MEAS - LV MAX PG: 3.6 MMHG
BH CV ECHO MEAS - LV MEAN PG: 1.7 MMHG
BH CV ECHO MEAS - LV SYSTOLIC VOL/BSA (12-30): 8.8 ML/M^2
BH CV ECHO MEAS - LV V1 MAX: 94.3 CM/SEC
BH CV ECHO MEAS - LV V1 MEAN: 60 CM/SEC
BH CV ECHO MEAS - LV V1 VTI: 15.6 CM
BH CV ECHO MEAS - LVIDD: 4.3 CM
BH CV ECHO MEAS - LVIDS: 2.4 CM
BH CV ECHO MEAS - LVOT AREA: 2.4 CM^2
BH CV ECHO MEAS - LVOT DIAM: 1.8 CM
BH CV ECHO MEAS - LVPWD: 0.94 CM
BH CV ECHO MEAS - LVPWS: 1.3 CM
BH CV ECHO MEAS - MV A MAX VEL: 59.4 CM/SEC
BH CV ECHO MEAS - MV DEC SLOPE: 166.5 CM/SEC^2
BH CV ECHO MEAS - MV DEC TIME: 0.25 SEC
BH CV ECHO MEAS - MV E MAX VEL: 42.2 CM/SEC
BH CV ECHO MEAS - MV E/A: 0.71
BH CV ECHO MEAS - MV MAX PG: 3.6 MMHG
BH CV ECHO MEAS - MV MEAN PG: 1.1 MMHG
BH CV ECHO MEAS - MV V2 MAX: 95.1 CM/SEC
BH CV ECHO MEAS - MV V2 MEAN: 48.4 CM/SEC
BH CV ECHO MEAS - MV V2 VTI: 17.3 CM
BH CV ECHO MEAS - MVA(VTI): 2.2 CM^2
BH CV ECHO MEAS - PA ACC TIME: 0.09 SEC
BH CV ECHO MEAS - PA MAX PG (FULL): -0.14 MMHG
BH CV ECHO MEAS - PA MAX PG: 3.7 MMHG
BH CV ECHO MEAS - PA MEAN PG (FULL): 0.22 MMHG
BH CV ECHO MEAS - PA MEAN PG: 2 MMHG
BH CV ECHO MEAS - PA PR(ACCEL): 37 MMHG
BH CV ECHO MEAS - PA V2 MAX: 95.6 CM/SEC
BH CV ECHO MEAS - PA V2 MEAN: 68.3 CM/SEC
BH CV ECHO MEAS - PA V2 VTI: 14.2 CM
BH CV ECHO MEAS - PULM A REVS DUR: 0.11 SEC
BH CV ECHO MEAS - PULM A REVS VEL: 38.3 CM/SEC
BH CV ECHO MEAS - PULM DIAS VEL: 32.7 CM/SEC
BH CV ECHO MEAS - PULM S/D: 1.5
BH CV ECHO MEAS - PULM SYS VEL: 48.3 CM/SEC
BH CV ECHO MEAS - RAP SYSTOLE: 3 MMHG
BH CV ECHO MEAS - RV MAX PG: 3.8 MMHG
BH CV ECHO MEAS - RV MEAN PG: 1.8 MMHG
BH CV ECHO MEAS - RV V1 MAX: 97.5 CM/SEC
BH CV ECHO MEAS - RV V1 MEAN: 61.9 CM/SEC
BH CV ECHO MEAS - RV V1 VTI: 16.5 CM
BH CV ECHO MEAS - RVDD: 2 CM
BH CV ECHO MEAS - RVSP: 19.1 MMHG
BH CV ECHO MEAS - SI(AO): 71.7 ML/M^2
BH CV ECHO MEAS - SI(CUBED): 42.3 ML/M^2
BH CV ECHO MEAS - SI(LVOT): 24.9 ML/M^2
BH CV ECHO MEAS - SI(MOD-SP4): 16.3 ML/M^2
BH CV ECHO MEAS - SI(TEICH): 40.6 ML/M^2
BH CV ECHO MEAS - SV(AO): 109.6 ML
BH CV ECHO MEAS - SV(CUBED): 64.6 ML
BH CV ECHO MEAS - SV(LVOT): 38.1 ML
BH CV ECHO MEAS - SV(MOD-SP4): 24.9 ML
BH CV ECHO MEAS - SV(TEICH): 62 ML
BH CV ECHO MEAS - TR MAX VEL: 200.9 CM/SEC
BH CV NUCLEAR PRIOR STUDY: 3
BH CV REST NUCLEAR ISOTOPE DOSE: 7.9 MCI
BH CV STRESS BP STAGE 1: NORMAL
BH CV STRESS BP STAGE 2: NORMAL
BH CV STRESS COMMENTS STAGE 1: NORMAL
BH CV STRESS COMMENTS STAGE 2: NORMAL
BH CV STRESS DOSE REGADENOSON STAGE 1: 0.4
BH CV STRESS DURATION MIN STAGE 1: 0
BH CV STRESS DURATION MIN STAGE 2: 4
BH CV STRESS DURATION SEC STAGE 1: 10
BH CV STRESS DURATION SEC STAGE 2: 0
BH CV STRESS HR STAGE 1: 76
BH CV STRESS HR STAGE 2: 110
BH CV STRESS NUCLEAR ISOTOPE DOSE: 20.7 MCI
BH CV STRESS PROTOCOL 1: NORMAL
BH CV STRESS RECOVERY BP: NORMAL MMHG
BH CV STRESS RECOVERY HR: 93 BPM
BH CV STRESS STAGE 1: 1
BH CV STRESS STAGE 2: 2
BUN SERPL-MCNC: 12 MG/DL (ref 6–20)
BUN/CREAT SERPL: 18.8 (ref 7–25)
CALCIUM SPEC-SCNC: 8.9 MG/DL (ref 8.6–10.5)
CHLORIDE SERPL-SCNC: 101 MMOL/L (ref 98–107)
CO2 SERPL-SCNC: 20 MMOL/L (ref 22–29)
CREAT SERPL-MCNC: 0.64 MG/DL (ref 0.57–1)
DEPRECATED RDW RBC AUTO: 48.1 FL (ref 37–54)
EOSINOPHIL # BLD AUTO: 0.1 10*3/MM3 (ref 0–0.4)
EOSINOPHIL NFR BLD AUTO: 1.5 % (ref 0.3–6.2)
ERYTHROCYTE [DISTWIDTH] IN BLOOD BY AUTOMATED COUNT: 12.9 % (ref 12.3–15.4)
GFR SERPL CREATININE-BSD FRML MDRD: 97 ML/MIN/1.73
GLUCOSE SERPL-MCNC: 101 MG/DL (ref 65–99)
HCT VFR BLD AUTO: 38.6 % (ref 34–46.6)
HGB BLD-MCNC: 13.9 G/DL (ref 12–15.9)
LV EF 2D ECHO EST: 60 %
LV EF NUC BP: 76 %
LYMPHOCYTES # BLD AUTO: 2.4 10*3/MM3 (ref 0.7–3.1)
LYMPHOCYTES NFR BLD AUTO: 36.2 % (ref 19.6–45.3)
MAXIMAL PREDICTED HEART RATE: 167 BPM
MAXIMAL PREDICTED HEART RATE: 167 BPM
MCH RBC QN AUTO: 38 PG (ref 26.6–33)
MCHC RBC AUTO-ENTMCNC: 36 G/DL (ref 31.5–35.7)
MCV RBC AUTO: 105.6 FL (ref 79–97)
MONOCYTES # BLD AUTO: 0.4 10*3/MM3 (ref 0.1–0.9)
MONOCYTES NFR BLD AUTO: 6.4 % (ref 5–12)
NEUTROPHILS NFR BLD AUTO: 3.7 10*3/MM3 (ref 1.7–7)
NEUTROPHILS NFR BLD AUTO: 55.4 % (ref 42.7–76)
NRBC BLD AUTO-RTO: 0.1 /100 WBC (ref 0–0.2)
PERCENT MAX PREDICTED HR: 70.06 %
PLATELET # BLD AUTO: 252 10*3/MM3 (ref 140–450)
PMV BLD AUTO: 8.5 FL (ref 6–12)
POTASSIUM SERPL-SCNC: 3.5 MMOL/L (ref 3.5–5.2)
RBC # BLD AUTO: 3.66 10*6/MM3 (ref 3.77–5.28)
SODIUM SERPL-SCNC: 137 MMOL/L (ref 136–145)
STRESS BASELINE BP: NORMAL MMHG
STRESS BASELINE HR: 76 BPM
STRESS PERCENT HR: 82 %
STRESS POST PEAK BP: NORMAL MMHG
STRESS POST PEAK HR: 117 BPM
STRESS TARGET HR: 142 BPM
STRESS TARGET HR: 142 BPM
WBC # BLD AUTO: 6.6 10*3/MM3 (ref 3.4–10.8)

## 2021-10-01 PROCEDURE — G0378 HOSPITAL OBSERVATION PER HR: HCPCS

## 2021-10-01 PROCEDURE — 25010000002 ENOXAPARIN PER 10 MG: Performed by: NURSE PRACTITIONER

## 2021-10-01 PROCEDURE — 93017 CV STRESS TEST TRACING ONLY: CPT

## 2021-10-01 PROCEDURE — A9502 TC99M TETROFOSMIN: HCPCS | Performed by: INTERNAL MEDICINE

## 2021-10-01 PROCEDURE — 78452 HT MUSCLE IMAGE SPECT MULT: CPT

## 2021-10-01 PROCEDURE — 0 TECHNETIUM TETROFOSMIN KIT: Performed by: INTERNAL MEDICINE

## 2021-10-01 PROCEDURE — 99214 OFFICE O/P EST MOD 30 MIN: CPT | Performed by: INTERNAL MEDICINE

## 2021-10-01 PROCEDURE — 25010000002 REGADENOSON 0.4 MG/5ML SOLUTION: Performed by: INTERNAL MEDICINE

## 2021-10-01 PROCEDURE — 93018 CV STRESS TEST I&R ONLY: CPT | Performed by: INTERNAL MEDICINE

## 2021-10-01 PROCEDURE — 80048 BASIC METABOLIC PNL TOTAL CA: CPT | Performed by: NURSE PRACTITIONER

## 2021-10-01 PROCEDURE — 99217 PR OBSERVATION CARE DISCHARGE MANAGEMENT: CPT | Performed by: INTERNAL MEDICINE

## 2021-10-01 PROCEDURE — 93016 CV STRESS TEST SUPVJ ONLY: CPT | Performed by: NURSE PRACTITIONER

## 2021-10-01 PROCEDURE — 85025 COMPLETE CBC W/AUTO DIFF WBC: CPT | Performed by: NURSE PRACTITIONER

## 2021-10-01 PROCEDURE — 96372 THER/PROPH/DIAG INJ SC/IM: CPT

## 2021-10-01 PROCEDURE — 78452 HT MUSCLE IMAGE SPECT MULT: CPT | Performed by: INTERNAL MEDICINE

## 2021-10-01 RX ORDER — METOPROLOL SUCCINATE 25 MG/1
25 TABLET, EXTENDED RELEASE ORAL DAILY
Qty: 30 TABLET | Refills: 0 | Status: SHIPPED | OUTPATIENT
Start: 2021-10-01 | End: 2021-11-03 | Stop reason: SDUPTHER

## 2021-10-01 RX ORDER — ALBUTEROL SULFATE 90 UG/1
2 AEROSOL, METERED RESPIRATORY (INHALATION) EVERY 4 HOURS PRN
Qty: 9 G | Refills: 0 | Status: SHIPPED | OUTPATIENT
Start: 2021-10-01 | End: 2021-10-31

## 2021-10-01 RX ADMIN — Medication 10 ML: at 09:19

## 2021-10-01 RX ADMIN — TETROFOSMIN 1 DOSE: 1.38 INJECTION, POWDER, LYOPHILIZED, FOR SOLUTION INTRAVENOUS at 07:21

## 2021-10-01 RX ADMIN — HYDROCODONE BITARTRATE AND ACETAMINOPHEN 1 TABLET: 10; 325 TABLET ORAL at 13:43

## 2021-10-01 RX ADMIN — HYDROCODONE BITARTRATE AND ACETAMINOPHEN 1 TABLET: 10; 325 TABLET ORAL at 09:19

## 2021-10-01 RX ADMIN — HYDROCODONE BITARTRATE AND ACETAMINOPHEN 1 TABLET: 10; 325 TABLET ORAL at 05:03

## 2021-10-01 RX ADMIN — ENOXAPARIN SODIUM 40 MG: 40 INJECTION SUBCUTANEOUS at 15:10

## 2021-10-01 RX ADMIN — ASPIRIN 325 MG: 325 TABLET, COATED ORAL at 09:19

## 2021-10-01 RX ADMIN — REGADENOSON 0.4 MG: 0.08 INJECTION, SOLUTION INTRAVENOUS at 08:35

## 2021-10-01 RX ADMIN — NICOTINE 1 PATCH: 21 PATCH, EXTENDED RELEASE TRANSDERMAL at 09:20

## 2021-10-01 RX ADMIN — TETROFOSMIN 1 DOSE: 1.38 INJECTION, POWDER, LYOPHILIZED, FOR SOLUTION INTRAVENOUS at 08:35

## 2021-10-01 RX ADMIN — PANTOPRAZOLE SODIUM 40 MG: 40 TABLET, DELAYED RELEASE ORAL at 06:11

## 2021-10-01 RX ADMIN — LOSARTAN POTASSIUM 25 MG: 25 TABLET, FILM COATED ORAL at 09:19

## 2021-10-01 NOTE — CONSULTS
Referring Provider: pérez Roblero  Reason for Consultation: Uncontrolled hypertension and chest discomfort    Chief complaint uncontrolled hypertension with multiple other nonspecific symptoms    Cardiology assessment and plan      Cardiology assessment and plan     Hypertensive urgency  Chest pain  Palpitations  Shortness of breath  Ventricular ectopy  Family history of coronary artery disease and premature coronary artery disease  Tobacco abuse  Ventricular bigeminy     Normal troponin  Normal renal function  Normal chest x-ray  Twelve-lead EKG with normal sinus rhythm with no acute ST changes     Labile hypertension at home  She takes losartan low-dose sometimes half a pill sometimes full pill based on a blood pressure reading  Some hypotensive episodes at home  Normal troponin  Normal renal function  Echocardiogram with normal LV systolic function no significant valve pathology  Myocardial perfusion study with no evidence of any significant reversible ischemia    Need for better control of blood pressure and risk factors reviewed and discussed with the patient  Pressure is better this morning  Likely discharge home with losartan 50 mg p.o. once a day and Toprol-XL 25 mg p.o. once a day  Regular monitoring of blood pressure at home and documenting before coming to the office  Need for close monitoring and follow-up reviewed and discussed with    Risk benefits and alternatives reviewed and discussed with the patient                History of present illness:  Kamini Graham is a 51 y.o. female who presents with past medical history that is significant for history of tobacco abuse history of hypertension hyperlipidemia strong family history of coronary artery disease and premature coronary artery disease presented to the office with symptoms of shortness of breath chest pain palpitations     Presented to the emergency room with chest pain and elevated blood pressure  Labile hypertension with some hypotension and  hypotensive episodes  Denies any dizziness or syncope  Currently denies any active chest discomfort  Denies any nausea vomiting  No abdominal pain  No loss of consciousness  No orthopnea no PND     Patient left AGAINST MEDICAL ADVICE and a heart readmitted back to the hospital  Labile blood pressure and also uncontrolled blood pressure at home  I am not sure about her compliance with medications at home she still takes the medications as needed basis based on blood pressure readings which patient was a discouraged not to do  Complaining of multiple nonspecific symptoms where she feels jittery anxious and some chest discomfort and shortness of breath  Denies any loss of consciousness    Review of Systems  Review of Systems   Constitutional: Negative for chills, decreased appetite and malaise/fatigue.   HENT: Negative for congestion and nosebleeds.    Eyes: Negative for blurred vision and double vision.   Cardiovascular: Positive for chest pain and dyspnea on exertion. Negative for irregular heartbeat, leg swelling, near-syncope, orthopnea, palpitations and paroxysmal nocturnal dyspnea.   Respiratory: Negative for cough and shortness of breath.    Hematologic/Lymphatic: Negative for adenopathy. Does not bruise/bleed easily.   Skin: Negative for nail changes and rash.   Musculoskeletal: Negative for back pain and joint pain.   Gastrointestinal: Negative for bloating, abdominal pain, hematemesis and hematochezia.   Genitourinary: Negative for flank pain and hematuria.   Neurological: Negative for dizziness and focal weakness.   Psychiatric/Behavioral: Negative for altered mental status. The patient is nervous/anxious.        Past Medical History  Past Medical History:   Diagnosis Date   • COPD (chronic obstructive pulmonary disease) (CMS/HCC)    • Hyperlipidemia    • Hypertension    • Stroke (CMS/HCC)     and   Past Surgical History:   Procedure Laterality Date   • HYSTERECTOMY         Family History  Family History  "  Family history unknown: Yes       Social History  Social History     Socioeconomic History   • Marital status:      Spouse name: Not on file   • Number of children: Not on file   • Years of education: Not on file   • Highest education level: Not on file   Tobacco Use   • Smoking status: Current Every Day Smoker     Packs/day: 2.00     Types: Cigarettes   • Smokeless tobacco: Never Used   Substance and Sexual Activity   • Alcohol use: Yes     Alcohol/week: 3.0 standard drinks     Types: 3 Shots of liquor per week   • Drug use: No   • Sexual activity: Yes       Objective     Physical Exam:  Constitutional:       Appearance: Well-developed.   Eyes:      Conjunctiva/sclera: Conjunctivae normal.      Pupils: Pupils are equal, round, and reactive to light.   HENT:      Head: Normocephalic and atraumatic.   Neck:      Thyroid: No thyromegaly.   Pulmonary:      Effort: Pulmonary effort is normal.      Breath sounds: Normal breath sounds.   Cardiovascular:      Normal rate. Regular rhythm.   Pulses:     Intact distal pulses.   Edema:     Peripheral edema absent.   Abdominal:      General: Bowel sounds are normal.      Palpations: Abdomen is soft.   Musculoskeletal:      Cervical back: Normal range of motion and neck supple. Skin:     General: Skin is warm.   Neurological:      Mental Status: Alert and oriented to person, place, and time.         Vital Signs  Vitals:    10/01/21 0034 10/01/21 0500 10/01/21 0740 10/01/21 1113   BP: 122/82 104/69 127/77 127/87   BP Location: Right arm Right arm Right arm Right arm   Patient Position: Sitting Sitting Lying Sitting   Pulse: 72 72 66 81   Resp: 16 16 15 18   Temp: 97.8 °F (36.6 °C) 97.4 °F (36.3 °C)  97.9 °F (36.6 °C)   TempSrc: Oral Oral  Oral   SpO2: 97% 95% 97% 97%   Weight:       Height:           Weight  Flowsheet Rows      First Filed Value   Admission Height  157.5 cm (62\") Documented at 09/30/2021 1457   Admission Weight  53.8 kg (118 lb 9.7 oz) Documented at " 09/30/2021 1457              Results Review:  Lab Results (last 24 hours)     Procedure Component Value Units Date/Time    Basic Metabolic Panel [[id number]]  (Abnormal) Collected: 10/01/21 0407    Specimen: Blood Updated: 10/01/21 0516     Glucose 101 mg/dL      BUN 12 mg/dL      Creatinine 0.64 mg/dL      Sodium 137 mmol/L      Potassium 3.5 mmol/L      Chloride 101 mmol/L      CO2 20.0 mmol/L      Calcium 8.9 mg/dL      eGFR Non African Amer 97 mL/min/1.73      BUN/Creatinine Ratio 18.8     Anion Gap 16.0 mmol/L     Narrative:      GFR Normal >60  Chronic Kidney Disease <60  Kidney Failure <15      CBC Auto Differential [[id number]]  (Abnormal) Collected: 10/01/21 0407    Specimen: Blood Updated: 10/01/21 0510     WBC 6.60 10*3/mm3      RBC 3.66 10*6/mm3      Hemoglobin 13.9 g/dL      Hematocrit 38.6 %      .6 fL      MCH 38.0 pg      MCHC 36.0 g/dL      RDW 12.9 %      RDW-SD 48.1 fl      MPV 8.5 fL      Platelets 252 10*3/mm3      Neutrophil % 55.4 %      Lymphocyte % 36.2 %      Monocyte % 6.4 %      Eosinophil % 1.5 %      Basophil % 0.5 %      Neutrophils, Absolute 3.70 10*3/mm3      Lymphocytes, Absolute 2.40 10*3/mm3      Monocytes, Absolute 0.40 10*3/mm3      Eosinophils, Absolute 0.10 10*3/mm3      Basophils, Absolute 0.00 10*3/mm3      nRBC 0.1 /100 WBC     Troponin [[id number]]  (Normal) Collected: 09/30/21 2116    Specimen: Blood Updated: 09/30/21 2149     Troponin T <0.010 ng/mL     Narrative:      Troponin T Reference Range:  <= 0.03 ng/mL-   Negative for AMI  >0.03 ng/mL-     Abnormal for myocardial necrosis.  Clinicians would have to utilize clinical acumen, EKG, Troponin and serial changes to determine if it is an Acute Myocardial Infarction or myocardial injury due to an underlying chronic condition.       Results may be falsely decreased if patient taking Biotin.      Urine Drug Screen - Urine, Clean Catch [[id number]]  (Abnormal) Collected: 09/29/21 1636    Specimen: Urine, Clean Catch  Updated: 09/30/21 1819     Amphet/Methamphet, Screen Negative     Barbiturates Screen, Urine Negative     Benzodiazepine Screen, Urine Negative     Cocaine Screen, Urine Negative     Opiate Screen Positive     THC, Screen, Urine Negative     Methadone Screen, Urine Negative     Oxycodone Screen, Urine Negative    Narrative:      Negative Thresholds Per Drugs Screened:    Amphetamines                 500 ng/ml  Barbiturates                 200 ng/ml  Benzodiazepines              100 ng/ml  Cocaine                      300 ng/ml  Methadone                    300 ng/ml  Opiates                      300 ng/ml  Oxycodone                    100 ng/ml  THC                           50 ng/ml    The Normal Value for all drugs tested is negative. This report includes final unconfirmed screening results to be used for medical treatment purposes only. Unconfirmed results must not be used for non-medical purposes such as employment or legal testing. Clinical consideration should be applied to any drug of abuse test, particularly when unconfirmed results are used.          All urine drugs of abuse requests without chain of custody are for medical screening purposes only.  False positives are possible.      Extra Tubes [[id number]] Collected: 09/30/21 1531    Specimen: Blood from Arm, Left Updated: 09/30/21 1646    Narrative:      The following orders were created for panel order Extra Tubes.  Procedure                               Abnormality         Status                     ---------                               -----------         ------                     Gold Top - SST[[id number]]                                   Final result                 Please view results for these tests on the individual orders.    Gold Top - SST [[id number]] Collected: 09/30/21 1531    Specimen: Blood from Arm, Left Updated: 09/30/21 1646     Extra Tube Hold for add-ons.     Comment: Auto resulted.           Imaging Results (Last 72 Hours)      Procedure Component Value Units Date/Time    XR Chest 1 View [[id number]] Collected: 09/30/21 1555     Updated: 09/30/21 1558    Narrative:      DATE OF EXAM:  9/30/2021 3:52 PM     PROCEDURE:  XR CHEST 1 VW-     INDICATIONS:  Chest pain protocol      COMPARISON:  9/29/2021.     TECHNIQUE:   Single radiographic view of the chest was obtained.     FINDINGS:  The heart size is normal. The pulmonary vascular markings are normal.  The lungs and pleural spaces are clear of active disease.  The bony  thorax is normal for age.       Impression:      No active disease.     Electronically Signed By-Clinton Andrade MD On:9/30/2021 3:56 PM  This report was finalized on [id number] by  Clinton Andrade MD.          Results for orders placed during the hospital encounter of 09/30/21    Adult Transthoracic Echo Complete W/ Cont if Necessary Per Protocol    Interpretation Summary  · Estimated left ventricular EF = 60% Estimated left ventricular EF was in agreement with the calculated left ventricular EF. Left ventricular systolic function is normal.  · Left ventricular diastolic function is consistent with (grade I) impaired relaxation.  · Estimated right ventricular systolic pressure from tricuspid regurgitation is normal (<35 mmHg).      Medication Review  Scheduled Meds:aspirin, 325 mg, Oral, Daily  atorvastatin, 80 mg, Oral, Nightly  enoxaparin, 40 mg, Subcutaneous, Q24H  losartan, 25 mg, Oral, Daily  nicotine, 1 patch, Transdermal, Q24H  pantoprazole, 40 mg, Oral, QAM  sodium chloride, 10 mL, Intravenous, Q12H      Continuous Infusions:   PRN Meds:.•  acetaminophen **OR** acetaminophen **OR** acetaminophen  •  aluminum-magnesium hydroxide-simethicone  •  HYDROcodone-acetaminophen  •  hydrOXYzine  •  ipratropium-albuterol  •  magnesium sulfate **OR** magnesium sulfate **OR** magnesium sulfate  •  melatonin  •  nitroglycerin  •  ondansetron **OR** ondansetron  •  potassium chloride  •  potassium chloride  •  sodium chloride  •  sodium  chloride    Assessment/Plan       Chest pain    COPD (chronic obstructive pulmonary disease) ([facility])    Current smoker    Mixed hyperlipidemia    Essential hypertension    H/O noncompliance with medical treatment, presenting hazards to health      Patient Active Problem List   Diagnosis   • Chest pain   • Palpitations   • Elevated lipoprotein(a)   • SOB (shortness of breath)   • COPD (chronic obstructive pulmonary disease) (HCC)   • Current smoker   • Mixed hyperlipidemia   • Essential hypertension   • Anxiety disorder   • Chronic pain   • H/O noncompliance with medical treatment, presenting hazards to health           Prabhu Duke MD  10/01/21  12:34 EDT

## 2021-10-01 NOTE — NURSING NOTE
Called prescriptions to [facility] d/t pharmacy not receiving new medications listed on AVS.

## 2021-10-01 NOTE — SIGNIFICANT NOTE
10/01/21 1610   Discharge of Care   Discharge Mode wheel chair   Discharged Accompanied by spouse   Discharge Contact Information if Applicable  [phone]   Discharge Teaching Done  Yes   Learning Method Explanation;Teach Back      Pt verbalizes understanding of discharge paperwork. Reviewed new medications with pt.  to provide transportation home.

## 2021-10-01 NOTE — CASE MANAGEMENT/SOCIAL WORK
Case Management Discharge Note      Final Note: left AMA prior to CM assessment              Final Discharge Disposition Code: 07 - left AMA

## 2021-10-01 NOTE — PLAN OF CARE
Goal Outcome Evaluation:  Plan of Care Reviewed With: patient        Progress: improving   Pt resting in bed.  Pt c/o back pain and prn pain pill given.  Instructed pt to call for assistance and call bell within reach

## 2021-10-01 NOTE — PLAN OF CARE
Goal Outcome Evaluation:           Progress: improving  Outcome Summary: Patient without complaints of chest pain on shift. With complaints of chronic back pain- treated see MAR. Myoview this AM. Awaiting MD rounds. Will continue to monitor.

## 2021-10-01 NOTE — CASE MANAGEMENT/SOCIAL WORK
Continued Stay Note  SCOTT Ocampo     Patient Name: Kamini Graham  MRN: [id number]  Today's Date: 10/1/2021    Admit Date: 9/30/2021    Discharge Plan     Row Name 10/01/21 1606       Plan    Plan  anticipate routine home with family    Patient/Family in Agreement with Plan  yes    Plan Comments  patient DC prior to CM assessment. patient lives at home with spouse. patient does not drive. patient spouse can provide transportation at DC. patient pcp and pharm confirmed. denies issues affording food or meds. patient is independent with ADLs. denies needs at this time. DC orders noted.    Final Discharge Disposition Code  01 - home or self-care    Final Note  home        Expected Discharge Date and Time     Expected Discharge Date Expected Discharge Time    Oct 1, 2021         Phone communication or documentation only - no physical contact with patient or family.      Mendy Powers RN

## 2021-10-01 NOTE — DISCHARGE SUMMARY
[facility] Medicine Services  DISCHARGE SUMMARY    Patient Name: Kamini Graham  : 1970  MRN: [id number]    Date of Admission: 2021  Date of Discharge:   10/01/21, 4:01 PM EDT    Primary Care Physician: Zari Church PA      Presenting Problem:   Chest pain, unspecified type [R07.9]    Active and Resolved Hospital Problems:  Active Hospital Problems    Diagnosis POA   • **Chest pain [R07.9] Yes     Priority: High   • H/O noncompliance with medical treatment, presenting hazards to health [Z91.19] Not Applicable   • COPD (chronic obstructive pulmonary disease) (HCC) [J44.9] Yes   • Current smoker [F17.200] Yes   • Essential hypertension [I10] Yes   • Mixed hyperlipidemia [E78.2] Yes      Resolved Hospital Problems   No resolved problems to display.         Hospital Course     Hospital Course:  Kamini Graham is a 51 y.o. female  Kamini Graham is a 51 y.o. female with a past medical history of COPD, tobacco abuse, hypertension, and hyperlipidemia who presented to [facility] on 2021 complaining of chest pain.  Patient originally was admitted to [facility] on 2021 that had started at 10:30 AM that morning. Patient reported that she was here on  with the same symptoms but refused admittance at that time.  She saw Dr. Duke as an outpatient and he gave her a prescription for sublingual nitro, which she took, which did help some with her pain.  He also ordered her an outpatient work-up.  Patient also has a history of hypertension and does not take her medications as prescribed.  She Stated that she takes what she thinks she needs the morning of depending on what her blood pressure is.  When her blood pressure was high yesterday morning she decided to take half of her losartan 50/12.5 mg tablet.  Yesterday she took a whole tablet.  Patient is a heavy smoker.  Patient had left AMA on morning of 2021 because she reports her  chest pain subsided.  She explains 2 hours after being home her chest pain came back.  She is having chest pain in the middle of her chest which she describes as achy.  She rates her chest pain a 4 out of 10.  Anxiety makes her chest pain worse.  She denies any alleviating factors.  She denies any other complaints at this time.    In the ED, chest x-ray unremarkable.  EKG showed sinus rhythm.  All labs unremarkable upon admission.  All vital signs stable except blood pressure 156/101.  Patient received aspirin and Nitrostat.    Patient was seen in consult by cardiology.  She had a stress test this morning which is reported as negative for ischemia.  She was counseled the pain is secondary to her COPD she was counseled to quit smoking.  Do not see that happening.    ROS  Constitutional: Negative for appetite change, chills, fatigue and fever.   HENT: Negative for congestion, facial swelling, sinus pain and sore throat.    Eyes: Negative for pain and visual disturbance.   Respiratory: Negative for cough, chest tightness and shortness of breath.    Cardiovascular: Resolved chest pain. Negative for palpitations.   Gastrointestinal: Negative for constipation, diarrhea, nausea and vomiting.   Genitourinary: Negative for dysuria, flank pain, frequency and urgency.   Musculoskeletal: Negative for arthralgias, joint swelling and neck pain.   Skin: Negative for color change and rash.   Neurological: Negative for dizziness, seizures, syncope, weakness, light-headedness and headaches.            DISCHARGE Follow Up Recommendations for labs and diagnostics:       Reasons For Change In Medications and Indications for New Medications:      Day of Discharge     Vital Signs:  Temp:  [97.4 °F (36.3 °C)-97.9 °F (36.6 °C)] 97.9 °F (36.6 °C)  Heart Rate:  [66-81] 81  Resp:  [15-18] 18  BP: (104-144)/(69-95) 127/87    Physical Exam:  Physical Exam Vitals and nursing note reviewed.   Constitutional:       General: She is not in acute  distress.     Appearance: She is well-developed. She is not diaphoretic.   HENT:      Head: Normocephalic and atraumatic.      Right Ear: External ear normal.      Left Ear: External ear normal.      Nose: Nose normal.      Mouth/Throat:      Pharynx: No oropharyngeal exudate.   Eyes:      General: No scleral icterus.        Right eye: No discharge.         Left eye: No discharge.      Conjunctiva/sclera: Conjunctivae normal.      Pupils: Pupils are equal, round, and reactive to light.   Neck:      Thyroid: No thyromegaly.      Vascular: No JVD.      Trachea: No tracheal deviation.   Cardiovascular:      Rate and Rhythm: Normal rate and regular rhythm.      Heart sounds: Normal heart sounds. No murmur heard.   No friction rub. No gallop.    Pulmonary:      Effort: Pulmonary effort is normal.      Breath sounds: No stridor. Rhonchi present. No wheezing.      Comments: Hyperinflated chest  Abdominal:      General: Bowel sounds are normal. There is no distension.      Palpations: Abdomen is soft. There is no mass.      Tenderness: There is no abdominal tenderness. There is no guarding or rebound.      Hernia: No hernia is present.   Musculoskeletal:         General: No tenderness or deformity. Normal range of motion.      Cervical back: Normal range of motion and neck supple.   Lymphadenopathy:      Cervical: No cervical adenopathy.   Skin:     General: Skin is warm and dry.      Coloration: Skin is not pale.      Findings: No erythema or rash.   Neurological:      Mental Status: She is alert and oriented to person, place, and time.      Cranial Nerves: No cranial nerve deficit.      Sensory: No sensory deficit.      Motor: No abnormal muscle tone.      Coordination: Coordination normal.   Psychiatric:         Behavior: Behavior normal.         Thought Content: Thought content normal.         Judgment: Judgment normal.      Reviewed, no change in above data from the prior day.      Pertinent  and/or Most Recent Results      LAB RESULTS:      Lab 10/01/21  0407 09/30/21  1531 09/30/21  0124 09/29/21  2021 09/29/21  1349   WBC 6.60 6.40 6.80 7.40 10.30   HEMOGLOBIN 13.9 15.3 13.4 13.9 14.9   HEMATOCRIT 38.6 43.4 38.7 39.2 41.6   PLATELETS 252 289 269 279 292   NEUTROS ABS 3.70 4.30  --  5.10 7.50*   LYMPHS ABS 2.40 1.70  --  1.90 2.10   MONOS ABS 0.40 0.30  --  0.30 0.50   EOS ABS 0.10 0.00  --  0.00 0.10   .6* 104.4* 106.1* 106.8* 106.1*   PROTIME  --  10.9  --   --   --    APTT  --  28.6  --   --   --          Lab 10/01/21  0407 09/30/21  1531 09/30/21  0124 09/29/21  1840 09/29/21  1349   SODIUM 137 135* 138  --  138   POTASSIUM 3.5 3.5 3.4*  --  3.9   CHLORIDE 101 96* 100  --  99   CO2 20.0* 22.0 22.0  --  22.0   ANION GAP 16.0* 17.0* 16.0*  --  17.0*   BUN 12 9 11  --  12   CREATININE 0.64 0.65 0.52*  --  0.62   GLUCOSE 101* 102* 107*  --  109*   CALCIUM 8.9 9.8 9.2  --  9.5   MAGNESIUM  --   --   --  1.8  --    PHOSPHORUS  --   --   --  3.3  --    HEMOGLOBIN A1C  --   --  5.5  --   --    TSH  --   --   --  0.951  --          Lab 09/30/21 1531 09/29/21  1349   TOTAL PROTEIN 7.5 7.5   ALBUMIN 4.90 4.80   GLOBULIN 2.6 2.7   ALT (SGPT) 10 12   AST (SGOT) 16 18   BILIRUBIN 0.9 0.5   ALK PHOS 91 88         Lab 09/30/21 2116 09/30/21 1531 09/30/21 0124 09/29/21 2021 09/29/21  1840   PROBNP  --   --   --   --  128.8   TROPONIN T <0.010 <0.010 <0.010 <0.010 <0.010   PROTIME  --  10.9  --   --   --    INR  --  0.98  --   --   --          Lab 09/30/21  0124   CHOLESTEROL 274*   LDL CHOL 171*   HDL CHOL 48   TRIGLYCERIDES 287*             Brief Urine Lab Results  (Last result in the past 365 days)      Color   Clarity   Blood   Leuk Est   Nitrite   Protein   CREAT   Urine HCG        09/29/21 1636 Yellow Clear Negative Negative Negative Negative             Microbiology Results (last 10 days)     Procedure Component Value - Date/Time    COVID-19,CEPHEID/MAURISIO/BDMAX,COR/NIKIAT/PAD/ANMOL IN-HOUSE(OR EMERGENT/ADD-ON),NP SWAB IN TRANSPORT  MEDIA 3-4 HR TAT, RT-PCR - Swab, Nasopharynx [[id number]]  (Normal) Collected: 09/30/21 1533    Lab Status: Final result Specimen: Swab from Nasopharynx Updated: 09/30/21 1629     COVID19 Not Detected    Narrative:      Fact sheet for providers: https://www.fda.gov/media/951023/download     Fact sheet for patients: https://www.fda.gov/media/993268/download  Fact sheet for providers: https://www.fda.gov/media/020673/download     Fact sheet for patients: https://www.fda.gov/media/867995/download    COVID PRE-OP / PRE-PROCEDURE SCREENING ORDER (NO ISOLATION) - Swab, Nasopharynx [[id number]]  (Normal) Collected: 09/29/21 1655    Lab Status: Final result Specimen: Swab from Nasopharynx Updated: 09/29/21 1726    Narrative:      The following orders were created for panel order COVID PRE-OP / PRE-PROCEDURE SCREENING ORDER (NO ISOLATION) - Swab, Nasopharynx.  Procedure                               Abnormality         Status                     ---------                               -----------         ------                     COVID-19,CEPHEID/MAURISIO/BD...[[id number]]  Normal              Final result                 Please view results for these tests on the individual orders.    COVID-19,CEPHEID/MAURISIO/BDMAX,COR/NIKITA/PAD/ANOML IN-HOUSE(OR EMERGENT/ADD-ON),NP SWAB IN TRANSPORT MEDIA 3-4 HR TAT, RT-PCR - Swab, Nasopharynx [[id number]]  (Normal) Collected: 09/29/21 1655    Lab Status: Final result Specimen: Swab from Nasopharynx Updated: 09/29/21 1726     COVID19 Not Detected    Narrative:      Fact sheet for providers: https://www.fda.gov/media/263345/download     Fact sheet for patients: https://www.fda.gov/media/958688/download  Fact sheet for providers: https://www.fda.gov/media/684868/download    Fact sheet for patients: https://www.fda.gov/media/834175/download    Test performed by PCR.          XR Chest 1 View    Result Date: 9/30/2021  Impression: No active disease.  Electronically Signed By-Clinton Andrade MD On:9/30/2021 3:56  PM This report was finalized on [id number] by  Clinton Andrade MD.    XR Chest 1 View    Result Date: 9/29/2021  Impression: No acute chest finding.  Electronically Signed By-Julee Marc MD On:9/29/2021 2:26 PM This report was finalized on [id number] by  Julee Marc MD.    XR Chest 1 View    Result Date: 9/19/2021  Impression: No acute cardiopulmonary process.  Electronically Signed By-Freida Schneider MD On:9/19/2021 3:40 PM This report was finalized on [id number] by  Freida Schneider MD.              Results for orders placed during the hospital encounter of 09/30/21    Adult Transthoracic Echo Complete W/ Cont if Necessary Per Protocol    Interpretation Summary  · Estimated left ventricular EF = 60% Estimated left ventricular EF was in agreement with the calculated left ventricular EF. Left ventricular systolic function is normal.  · Left ventricular diastolic function is consistent with (grade I) impaired relaxation.  · Estimated right ventricular systolic pressure from tricuspid regurgitation is normal (<35 mmHg).      Labs Pending at Discharge:      Procedures Performed           Consults:   Consults     Date and Time Order Name Status Description    9/30/2021  4:54 PM Inpatient Cardiology Consult      9/29/2021  4:08 PM Cardiology (on-call MD unless specified) Completed     9/29/2021  4:08 PM Hospitalist (on-call MD unless specified) Completed             Discharge Details        Discharge Medications      New Medications      Instructions Start Date   albuterol sulfate  (90 Base) MCG/ACT inhaler  Commonly known as: PROVENTIL HFA;VENTOLIN HFA;PROAIR HFA   2 puffs, Inhalation, Every 4 Hours PRN      metoprolol succinate XL 25 MG 24 hr tablet  Commonly known as: Toprol XL   25 mg, Oral, Daily      tiotropium 18 MCG per inhalation capsule  Commonly known as: Spiriva HandiHaler   1 capsule, Inhalation, Daily - RT         Continue These Medications      Instructions Start Date   aspirin 81 MG EC  tablet   81 mg, Oral, Daily      atorvastatin 80 MG tablet  Commonly known as: LIPITOR   80 mg, Oral, Nightly      HYDROcodone-acetaminophen  MG per tablet  Commonly known as: NORCO   1 tablet, Oral, 5 Times Daily PRN      losartan-hydrochlorothiazide 50-12.5 MG per tablet  Commonly known as: HYZAAR   1 tablet, Oral, Daily      pantoprazole 40 MG EC tablet  Commonly known as: PROTONIX   40 mg, Oral, Daily             Allergies   Allergen Reactions   • Iodinated Diagnostic Agents Anaphylaxis   • Methylprednisolone Palpitations   • Prednisone Palpitations         Discharge Disposition:       Diet:  Hospital:  Diet Order   Procedures   • Diet Cardiac; Healthy Heart         Discharge Activity:   Activity Instructions     Activity as Tolerated              CODE STATUS:  Code Status and Medical Interventions:   Ordered at: 09/30/21 1633     Level Of Support Discussed With:    Patient     Code Status:    CPR     Medical Interventions (Level of Support Prior to Arrest):    Full         Future Appointments   Date Time Provider Department Center   10/26/2021  8:00 AM NIKITA PEDRO ECHO/VAS CART [facility]   10/26/2021  8:00 AM NIKITA PEDRO INJECTION ROOM [facility]   10/26/2021  8:15 AM NIKITA PEDRO HOLTER [facility]   10/26/2021  9:00 AM NIKITA PEDRO IMAGING ROOM [facility]   10/26/2021  9:15 AM NIKITA PEDRO STRESS LAB [facility]   10/26/2021 10:15 AM NIKITA PEDRO IMAGING ROOM [facility]   11/3/2021  1:00 PM Prabhu Duke MD [facility]       Additional Instructions for the Follow-ups that You Need to Schedule     Discharge Follow-up with PCP   As directed       Currently Documented PCP:    Zari Church PA    PCP Phone Number:    [phone]     Follow Up Details: If no PCP, call MD finder at [phone]                 This patient has been examined wearing appropriate Personal Protective Equipment . 10/01/21      Signature: Iron Ortega MD, FACP

## 2021-10-07 LAB — QT INTERVAL: 389 MS

## 2021-10-26 ENCOUNTER — APPOINTMENT (OUTPATIENT)
Dept: CARDIOLOGY | Facility: HOSPITAL | Age: 51
End: 2021-10-26

## 2021-11-03 ENCOUNTER — OFFICE VISIT (OUTPATIENT)
Dept: CARDIOLOGY | Facility: CLINIC | Age: 51
End: 2021-11-03

## 2021-11-03 VITALS
DIASTOLIC BLOOD PRESSURE: 93 MMHG | HEART RATE: 62 BPM | OXYGEN SATURATION: 96 % | BODY MASS INDEX: 23 KG/M2 | HEIGHT: 62 IN | SYSTOLIC BLOOD PRESSURE: 160 MMHG | WEIGHT: 125 LBS

## 2021-11-03 DIAGNOSIS — E78.2 MIXED HYPERLIPIDEMIA: Chronic | ICD-10-CM

## 2021-11-03 DIAGNOSIS — Z91.199 H/O NONCOMPLIANCE WITH MEDICAL TREATMENT, PRESENTING HAZARDS TO HEALTH: Chronic | ICD-10-CM

## 2021-11-03 DIAGNOSIS — I10 ESSENTIAL HYPERTENSION: Primary | Chronic | ICD-10-CM

## 2021-11-03 DIAGNOSIS — R06.02 SOB (SHORTNESS OF BREATH): ICD-10-CM

## 2021-11-03 DIAGNOSIS — R00.2 PALPITATIONS: ICD-10-CM

## 2021-11-03 PROCEDURE — 99214 OFFICE O/P EST MOD 30 MIN: CPT | Performed by: INTERNAL MEDICINE

## 2021-11-03 RX ORDER — METOPROLOL SUCCINATE 25 MG/1
25 TABLET, EXTENDED RELEASE ORAL DAILY
Qty: 90 TABLET | Refills: 3 | Status: SHIPPED | OUTPATIENT
Start: 2021-11-03 | End: 2021-12-03

## 2021-11-03 RX ORDER — CHLORAL HYDRATE 500 MG
CAPSULE ORAL
COMMUNITY

## 2021-11-03 NOTE — PROGRESS NOTES
Cardiology Office Visit      Encounter Date:  11/03/2021    Patient ID:   Kamini Graham is a 51 y.o. female.    Reason For Followup:  Chest pain  Palpitations  Shortness of breath    Brief Clinical History:  Dear Dr. Church, ARNAUD Rios    I had the pleasure of seeing Kamini Graham today. As you are well aware, this is a 51 y.o. female the past medical history that is significant for history of tobacco abuse history of hypertension hyperlipidemia strong family history of coronary artery disease and premature coronary artery disease presented to the office with symptoms of shortness of breath chest pain palpitations    Interval History:  Recent hospitalization for hypertensive urgency  Denies any syncope  No orthopnea no PND  Strong family history of coronary artery disease and premature coronary artery disease  History of tobacco abuse    Interpretation Summary    · Findings consistent with a normal ECG stress test.  · Left ventricular ejection fraction is hyperdynamic (Calculated EF > 70%). .  · Myocardial perfusion imaging indicates a normal myocardial perfusion study with no evidence of ischemia.  · Impressions are consistent with a low risk study.    Interpretation Summary    · Estimated left ventricular EF = 60% Estimated left ventricular EF was in agreement with the calculated left ventricular EF. Left ventricular systolic function is normal.  · Left ventricular diastolic function is consistent with (grade I) impaired relaxation.  · Estimated right ventricular systolic pressure from tricuspid regurgitation is normal (<35 mmHg).          Assessment & Plan    Impressions:  Chest pain  Palpitations  Shortness of breath  Ventricular ectopy  Family history of coronary artery disease and premature coronary artery disease  Tobacco abuse  Ventricular bigeminy  Hypertensive urgency now better with medical therapy    Recommendations:  Normal stress test with no inducible ischemia  Normal echocardiogram  "with normal LV systolic function  Continue current medical therapy with losartan hydrochlorothiazide 50 x 12.5 mg p.o. once a day  Continue current dose of Toprol-XL 25 mg p.o. once a day  Recent labs and work-up that was done in the hospital admission  reviewed and discussed with patient  Sublingual nitroglycerin as needed for chest pain  Follow-up in office in 3 months      Objective:    Vitals:  Vitals:    11/03/21 1306   BP: 160/93   Pulse: 62   SpO2: 96%   Weight: 56.7 kg (125 lb)   Height: 157.5 cm (62\")       Physical Exam:    General: Alert, cooperative, no distress, appears stated age  Head:  Normocephalic, atraumatic, mucous membranes moist  Eyes:  Conjunctiva/corneas clear, EOM's intact     Neck:  Supple,  no adenopathy;      Lungs: Clear to auscultation bilaterally, no wheezes rhonchi rales are noted  Chest wall: No tenderness  Heart::  Regular rate and rhythm, S1 and S2 normal, no murmur, rub or gallop  Abdomen: Soft, non-tender, nondistended bowel sounds active  Extremities: No cyanosis, clubbing, or edema  Pulses: 2+ and symmetric all extremities  Skin:  No rashes or lesions  Neuro/psych: A&O x3. CN II through XII are grossly intact with appropriate affect      Allergies:  Allergies   Allergen Reactions   • Iodinated Diagnostic Agents Anaphylaxis   • Methylprednisolone Palpitations   • Prednisone Palpitations       Medication Review:     Current Outpatient Medications:   •  aspirin 81 MG EC tablet, Take 81 mg by mouth Daily., Disp: , Rfl:   •  atorvastatin (LIPITOR) 80 MG tablet, Take 80 mg by mouth Every Night., Disp: , Rfl:   •  HYDROcodone-acetaminophen (NORCO)  MG per tablet, Take 1 tablet by mouth 5 (Five) Times a Day As Needed for Moderate Pain ., Disp: , Rfl:   •  losartan-hydrochlorothiazide (HYZAAR) 50-12.5 MG per tablet, Take 1 tablet by mouth Daily., Disp: , Rfl:   •  Omega-3 Fatty Acids (fish oil) 1000 MG capsule capsule, Take  by mouth Daily With Breakfast., Disp: , Rfl:   •  " pantoprazole (PROTONIX) 40 MG EC tablet, Take 40 mg by mouth Daily., Disp: , Rfl:   •  metoprolol succinate XL (Toprol XL) 25 MG 24 hr tablet, Take 1 tablet by mouth Daily for 30 days., Disp: 90 tablet, Rfl: 3  •  tiotropium (Spiriva HandiHaler) 18 MCG per inhalation capsule, Place 1 capsule into inhaler and inhale Daily for 30 days., Disp: 30 capsule, Rfl: 0    Family History:  Family History   Family history unknown: Yes       Past Medical History:  Past Medical History:   Diagnosis Date   • COPD (chronic obstructive pulmonary disease) (HCC)    • Hyperlipidemia    • Hypertension    • Stroke (HCC)        Past surgical History:  Past Surgical History:   Procedure Laterality Date   • HYSTERECTOMY         Social History:  Social History     Socioeconomic History   • Marital status:    Tobacco Use   • Smoking status: Current Every Day Smoker     Packs/day: 2.00     Types: Cigarettes   • Smokeless tobacco: Never Used   Substance and Sexual Activity   • Alcohol use: Yes     Alcohol/week: 3.0 standard drinks     Types: 3 Shots of liquor per week   • Drug use: No   • Sexual activity: Yes       Review of Systems:  The following systems were reviewed as they relate to the cardiovascular system: Constitutional, Eyes, ENT, Cardiovascular, Respiratory, Gastrointestinal, Integumentary, Neurological, Psychiatric, Hematologic, Endocrine, Musculoskeletal, and Genitourinary. The pertinent cardiovascular findings are reported above with all other cardiovascular points within those systems being negative.    Diagnostic Study Review:     Current Electrocardiogram:  Procedures      NOTE: The following portions of the patient's history were reviewed and updated this visit as appropriate: allergies, current medications, past family history, past medical history, past social history, past surgical history and problem list.

## 2022-02-01 ENCOUNTER — TRANSCRIBE ORDERS (OUTPATIENT)
Dept: ADMINISTRATIVE | Facility: HOSPITAL | Age: 52
End: 2022-02-01

## 2022-02-01 DIAGNOSIS — R06.02 SHORTNESS OF BREATH: Primary | ICD-10-CM

## 2022-02-01 DIAGNOSIS — Z01.818 OTHER SPECIFIED PRE-OPERATIVE EXAMINATION: ICD-10-CM

## 2024-12-28 ENCOUNTER — APPOINTMENT (OUTPATIENT)
Dept: GENERAL RADIOLOGY | Facility: HOSPITAL | Age: 54
End: 2024-12-28
Payer: MEDICAID

## 2024-12-28 ENCOUNTER — HOSPITAL ENCOUNTER (INPATIENT)
Facility: HOSPITAL | Age: 54
LOS: 2 days | Discharge: LEFT BEFORE DISCHARGE | End: 2024-12-30
Attending: EMERGENCY MEDICINE | Admitting: INTERNAL MEDICINE
Payer: MEDICAID

## 2024-12-28 DIAGNOSIS — R09.02 HYPOXIA: ICD-10-CM

## 2024-12-28 DIAGNOSIS — U07.1 COVID: Primary | ICD-10-CM

## 2024-12-28 DIAGNOSIS — J44.1 COPD EXACERBATION: ICD-10-CM

## 2024-12-28 LAB
ANION GAP SERPL CALCULATED.3IONS-SCNC: 15 MMOL/L (ref 5–15)
BASOPHILS # BLD AUTO: 0.01 10*3/MM3 (ref 0–0.2)
BASOPHILS NFR BLD AUTO: 0.1 % (ref 0–1.5)
BUN SERPL-MCNC: 19 MG/DL (ref 6–20)
BUN/CREAT SERPL: 25.7 (ref 7–25)
CALCIUM SPEC-SCNC: 9.7 MG/DL (ref 8.6–10.5)
CHLORIDE SERPL-SCNC: 100 MMOL/L (ref 98–107)
CO2 SERPL-SCNC: 23 MMOL/L (ref 22–29)
CREAT SERPL-MCNC: 0.74 MG/DL (ref 0.57–1)
DEPRECATED RDW RBC AUTO: 57.5 FL (ref 37–54)
EGFRCR SERPLBLD CKD-EPI 2021: 95.1 ML/MIN/1.73
EOSINOPHIL # BLD AUTO: 0.03 10*3/MM3 (ref 0–0.4)
EOSINOPHIL NFR BLD AUTO: 0.4 % (ref 0.3–6.2)
ERYTHROCYTE [DISTWIDTH] IN BLOOD BY AUTOMATED COUNT: 13.9 % (ref 12.3–15.4)
FLUAV RNA RESP QL NAA+PROBE: NOT DETECTED
FLUBV RNA RESP QL NAA+PROBE: NOT DETECTED
GLUCOSE SERPL-MCNC: 104 MG/DL (ref 65–99)
HCT VFR BLD AUTO: 40.3 % (ref 34–46.6)
HGB BLD-MCNC: 14.2 G/DL (ref 12–15.9)
IMM GRANULOCYTES # BLD AUTO: 0.05 10*3/MM3 (ref 0–0.05)
IMM GRANULOCYTES NFR BLD AUTO: 0.6 % (ref 0–0.5)
LYMPHOCYTES # BLD AUTO: 1.22 10*3/MM3 (ref 0.7–3.1)
LYMPHOCYTES NFR BLD AUTO: 15.8 % (ref 19.6–45.3)
MCH RBC QN AUTO: 39 PG (ref 26.6–33)
MCHC RBC AUTO-ENTMCNC: 35.2 G/DL (ref 31.5–35.7)
MCV RBC AUTO: 110.7 FL (ref 79–97)
MONOCYTES # BLD AUTO: 0.29 10*3/MM3 (ref 0.1–0.9)
MONOCYTES NFR BLD AUTO: 3.8 % (ref 5–12)
NEUTROPHILS NFR BLD AUTO: 6.13 10*3/MM3 (ref 1.7–7)
NEUTROPHILS NFR BLD AUTO: 79.3 % (ref 42.7–76)
NRBC BLD AUTO-RTO: 0 /100 WBC (ref 0–0.2)
PLATELET # BLD AUTO: 171 10*3/MM3 (ref 140–450)
PMV BLD AUTO: 10 FL (ref 6–12)
POTASSIUM SERPL-SCNC: 4.2 MMOL/L (ref 3.5–5.2)
PROCALCITONIN SERPL-MCNC: 0.07 NG/ML (ref 0–0.25)
RBC # BLD AUTO: 3.64 10*6/MM3 (ref 3.77–5.28)
RSV RNA RESP QL NAA+PROBE: NOT DETECTED
SARS-COV-2 RNA RESP QL NAA+PROBE: DETECTED
SODIUM SERPL-SCNC: 138 MMOL/L (ref 136–145)
WBC NRBC COR # BLD AUTO: 7.73 10*3/MM3 (ref 3.4–10.8)

## 2024-12-28 PROCEDURE — 99285 EMERGENCY DEPT VISIT HI MDM: CPT

## 2024-12-28 PROCEDURE — 93005 ELECTROCARDIOGRAM TRACING: CPT

## 2024-12-28 PROCEDURE — 36415 COLL VENOUS BLD VENIPUNCTURE: CPT

## 2024-12-28 PROCEDURE — 94640 AIRWAY INHALATION TREATMENT: CPT

## 2024-12-28 PROCEDURE — 84145 PROCALCITONIN (PCT): CPT | Performed by: INTERNAL MEDICINE

## 2024-12-28 PROCEDURE — 93005 ELECTROCARDIOGRAM TRACING: CPT | Performed by: EMERGENCY MEDICINE

## 2024-12-28 PROCEDURE — 94799 UNLISTED PULMONARY SVC/PX: CPT

## 2024-12-28 PROCEDURE — 87637 SARSCOV2&INF A&B&RSV AMP PRB: CPT | Performed by: EMERGENCY MEDICINE

## 2024-12-28 PROCEDURE — 85025 COMPLETE CBC W/AUTO DIFF WBC: CPT | Performed by: EMERGENCY MEDICINE

## 2024-12-28 PROCEDURE — 25010000002 HYDRALAZINE PER 20 MG: Performed by: FAMILY MEDICINE

## 2024-12-28 PROCEDURE — 71045 X-RAY EXAM CHEST 1 VIEW: CPT

## 2024-12-28 PROCEDURE — 80048 BASIC METABOLIC PNL TOTAL CA: CPT | Performed by: EMERGENCY MEDICINE

## 2024-12-28 PROCEDURE — 94761 N-INVAS EAR/PLS OXIMETRY MLT: CPT

## 2024-12-28 RX ORDER — IPRATROPIUM BROMIDE AND ALBUTEROL SULFATE 2.5; .5 MG/3ML; MG/3ML
3 SOLUTION RESPIRATORY (INHALATION) ONCE
Status: DISCONTINUED | OUTPATIENT
Start: 2024-12-28 | End: 2024-12-28

## 2024-12-28 RX ORDER — AMOXICILLIN 250 MG
2 CAPSULE ORAL 2 TIMES DAILY PRN
Status: DISCONTINUED | OUTPATIENT
Start: 2024-12-28 | End: 2024-12-30 | Stop reason: HOSPADM

## 2024-12-28 RX ORDER — ONDANSETRON 4 MG/1
4 TABLET, ORALLY DISINTEGRATING ORAL EVERY 6 HOURS PRN
Status: DISCONTINUED | OUTPATIENT
Start: 2024-12-28 | End: 2024-12-30 | Stop reason: HOSPADM

## 2024-12-28 RX ORDER — DEXAMETHASONE 4 MG/1
6 TABLET ORAL DAILY
Status: DISCONTINUED | OUTPATIENT
Start: 2024-12-28 | End: 2024-12-28

## 2024-12-28 RX ORDER — BISACODYL 10 MG
10 SUPPOSITORY, RECTAL RECTAL DAILY PRN
Status: DISCONTINUED | OUTPATIENT
Start: 2024-12-28 | End: 2024-12-30 | Stop reason: HOSPADM

## 2024-12-28 RX ORDER — ONDANSETRON 2 MG/ML
4 INJECTION INTRAMUSCULAR; INTRAVENOUS EVERY 6 HOURS PRN
Status: DISCONTINUED | OUTPATIENT
Start: 2024-12-28 | End: 2024-12-30 | Stop reason: HOSPADM

## 2024-12-28 RX ORDER — POLYETHYLENE GLYCOL 3350 17 G/17G
17 POWDER, FOR SOLUTION ORAL DAILY PRN
Status: DISCONTINUED | OUTPATIENT
Start: 2024-12-28 | End: 2024-12-30 | Stop reason: HOSPADM

## 2024-12-28 RX ORDER — ALBUTEROL SULFATE 90 UG/1
2 INHALANT RESPIRATORY (INHALATION) EVERY 4 HOURS PRN
Status: DISCONTINUED | OUTPATIENT
Start: 2024-12-28 | End: 2024-12-28

## 2024-12-28 RX ORDER — ACETAMINOPHEN 160 MG/5ML
650 SOLUTION ORAL EVERY 4 HOURS PRN
Status: DISCONTINUED | OUTPATIENT
Start: 2024-12-28 | End: 2024-12-30 | Stop reason: HOSPADM

## 2024-12-28 RX ORDER — ALBUTEROL SULFATE 90 UG/1
2 INHALANT RESPIRATORY (INHALATION)
Status: DISCONTINUED | OUTPATIENT
Start: 2024-12-28 | End: 2024-12-28

## 2024-12-28 RX ORDER — ALBUTEROL SULFATE 90 UG/1
2 INHALANT RESPIRATORY (INHALATION)
Status: DISCONTINUED | OUTPATIENT
Start: 2024-12-28 | End: 2024-12-30 | Stop reason: HOSPADM

## 2024-12-28 RX ORDER — ACETAMINOPHEN 325 MG/1
650 TABLET ORAL EVERY 4 HOURS PRN
Status: DISCONTINUED | OUTPATIENT
Start: 2024-12-28 | End: 2024-12-30 | Stop reason: HOSPADM

## 2024-12-28 RX ORDER — IPRATROPIUM BROMIDE AND ALBUTEROL SULFATE 2.5; .5 MG/3ML; MG/3ML
3 SOLUTION RESPIRATORY (INHALATION)
Status: DISCONTINUED | OUTPATIENT
Start: 2024-12-28 | End: 2024-12-28

## 2024-12-28 RX ORDER — PANTOPRAZOLE SODIUM 40 MG/1
40 TABLET, DELAYED RELEASE ORAL DAILY
Status: DISCONTINUED | OUTPATIENT
Start: 2024-12-29 | End: 2024-12-30 | Stop reason: HOSPADM

## 2024-12-28 RX ORDER — HYDROCODONE BITARTRATE AND ACETAMINOPHEN 10; 325 MG/1; MG/1
1 TABLET ORAL
Status: DISCONTINUED | OUTPATIENT
Start: 2024-12-28 | End: 2024-12-30 | Stop reason: HOSPADM

## 2024-12-28 RX ORDER — ALBUTEROL SULFATE 90 UG/1
2 INHALANT RESPIRATORY (INHALATION) EVERY 4 HOURS PRN
Status: DISCONTINUED | OUTPATIENT
Start: 2024-12-28 | End: 2024-12-30 | Stop reason: HOSPADM

## 2024-12-28 RX ORDER — LOSARTAN POTASSIUM 50 MG/1
50 TABLET ORAL
Status: DISCONTINUED | OUTPATIENT
Start: 2024-12-29 | End: 2024-12-30 | Stop reason: HOSPADM

## 2024-12-28 RX ORDER — ACETAMINOPHEN 650 MG/1
650 SUPPOSITORY RECTAL EVERY 4 HOURS PRN
Status: DISCONTINUED | OUTPATIENT
Start: 2024-12-28 | End: 2024-12-30 | Stop reason: HOSPADM

## 2024-12-28 RX ORDER — ALUMINA, MAGNESIA, AND SIMETHICONE 2400; 2400; 240 MG/30ML; MG/30ML; MG/30ML
15 SUSPENSION ORAL EVERY 6 HOURS PRN
Status: DISCONTINUED | OUTPATIENT
Start: 2024-12-28 | End: 2024-12-30 | Stop reason: HOSPADM

## 2024-12-28 RX ORDER — SODIUM CHLORIDE 9 MG/ML
40 INJECTION, SOLUTION INTRAVENOUS AS NEEDED
Status: DISCONTINUED | OUTPATIENT
Start: 2024-12-28 | End: 2024-12-30 | Stop reason: HOSPADM

## 2024-12-28 RX ORDER — SODIUM CHLORIDE 0.9 % (FLUSH) 0.9 %
10 SYRINGE (ML) INJECTION AS NEEDED
Status: DISCONTINUED | OUTPATIENT
Start: 2024-12-28 | End: 2024-12-30 | Stop reason: HOSPADM

## 2024-12-28 RX ORDER — HYDRALAZINE HYDROCHLORIDE 20 MG/ML
10 INJECTION INTRAMUSCULAR; INTRAVENOUS EVERY 4 HOURS PRN
Status: DISCONTINUED | OUTPATIENT
Start: 2024-12-28 | End: 2024-12-30 | Stop reason: HOSPADM

## 2024-12-28 RX ORDER — HYDROCODONE BITARTRATE AND ACETAMINOPHEN 10; 325 MG/1; MG/1
1 TABLET ORAL
Status: DISCONTINUED | OUTPATIENT
Start: 2024-12-28 | End: 2024-12-28 | Stop reason: SDUPTHER

## 2024-12-28 RX ORDER — BISACODYL 5 MG/1
5 TABLET, DELAYED RELEASE ORAL DAILY PRN
Status: DISCONTINUED | OUTPATIENT
Start: 2024-12-28 | End: 2024-12-30 | Stop reason: HOSPADM

## 2024-12-28 RX ORDER — NICOTINE 21 MG/24HR
1 PATCH, TRANSDERMAL 24 HOURS TRANSDERMAL
Status: DISCONTINUED | OUTPATIENT
Start: 2024-12-28 | End: 2024-12-30 | Stop reason: HOSPADM

## 2024-12-28 RX ORDER — DEXAMETHASONE SODIUM PHOSPHATE 10 MG/ML
6 INJECTION, SOLUTION INTRAMUSCULAR; INTRAVENOUS DAILY
Status: DISCONTINUED | OUTPATIENT
Start: 2024-12-28 | End: 2024-12-28

## 2024-12-28 RX ORDER — ASPIRIN 81 MG/1
81 TABLET ORAL DAILY
Status: DISCONTINUED | OUTPATIENT
Start: 2024-12-29 | End: 2024-12-30 | Stop reason: HOSPADM

## 2024-12-28 RX ORDER — NITROGLYCERIN 0.4 MG/1
0.4 TABLET SUBLINGUAL
Status: DISCONTINUED | OUTPATIENT
Start: 2024-12-28 | End: 2024-12-30 | Stop reason: HOSPADM

## 2024-12-28 RX ORDER — SODIUM CHLORIDE 0.9 % (FLUSH) 0.9 %
10 SYRINGE (ML) INJECTION EVERY 12 HOURS SCHEDULED
Status: DISCONTINUED | OUTPATIENT
Start: 2024-12-28 | End: 2024-12-30 | Stop reason: HOSPADM

## 2024-12-28 RX ORDER — HYDROCHLOROTHIAZIDE 12.5 MG/1
12.5 TABLET ORAL
Status: DISCONTINUED | OUTPATIENT
Start: 2024-12-29 | End: 2024-12-30 | Stop reason: HOSPADM

## 2024-12-28 RX ADMIN — NIRMATRELVIR AND RITONAVIR 3 TABLET: KIT at 21:37

## 2024-12-28 RX ADMIN — Medication 10 ML: at 21:37

## 2024-12-28 RX ADMIN — NICOTINE 1 PATCH: 21 PATCH TRANSDERMAL at 14:27

## 2024-12-28 RX ADMIN — Medication 10 ML: at 22:51

## 2024-12-28 RX ADMIN — HYDROCODONE BITARTRATE AND ACETAMINOPHEN 1 TABLET: 10; 325 TABLET ORAL at 19:27

## 2024-12-28 RX ADMIN — HYDRALAZINE HYDROCHLORIDE 10 MG: 20 INJECTION, SOLUTION INTRAMUSCULAR; INTRAVENOUS at 22:51

## 2024-12-28 RX ADMIN — NIRMATRELVIR AND RITONAVIR 3 TABLET: KIT at 14:27

## 2024-12-28 RX ADMIN — HYDROCODONE BITARTRATE AND ACETAMINOPHEN 1 TABLET: 10; 325 TABLET ORAL at 22:44

## 2024-12-28 RX ADMIN — ALBUTEROL SULFATE 2 PUFF: 108 AEROSOL, METERED RESPIRATORY (INHALATION) at 11:30

## 2024-12-28 RX ADMIN — ALBUTEROL SULFATE 2 PUFF: 108 AEROSOL, METERED RESPIRATORY (INHALATION) at 20:55

## 2024-12-28 RX ADMIN — ALBUTEROL SULFATE 2 PUFF: 108 AEROSOL, METERED RESPIRATORY (INHALATION) at 23:50

## 2024-12-28 NOTE — ED PROVIDER NOTES
Subjective   History of Present Illness  Patient is a 54-year-old female complaint of cough congestion Hartness of breath developed over the past several days percents fever chest pain vomiting or other complaints      Review of Systems    Past Medical History:   Diagnosis Date    COPD (chronic obstructive pulmonary disease)     Hyperlipidemia     Hypertension     Stroke        Allergies   Allergen Reactions    Iodinated Contrast Media Anaphylaxis    Methylprednisolone Palpitations    Prednisone Palpitations       Past Surgical History:   Procedure Laterality Date    HYSTERECTOMY         Family History   Family history unknown: Yes       Social History     Socioeconomic History    Marital status:    Tobacco Use    Smoking status: Every Day     Current packs/day: 2.00     Types: Cigarettes    Smokeless tobacco: Never   Substance and Sexual Activity    Alcohol use: Yes     Alcohol/week: 3.0 standard drinks of alcohol     Types: 3 Shots of liquor per week    Drug use: No    Sexual activity: Yes           Objective   Physical Exam  Neck has no adenopathy JVD or bruits.  Lungs have extra wheezes throughout.  Heart has regular rate rhythm without murmur.  Chest is nontender.  Abdomen soft nontender.  Extremities I am unremarkable.  Procedures     EKG interpretation was normal sinus rhythm at a rate of 87 with no acute ST change      ED Course      Results for orders placed or performed during the hospital encounter of 12/28/24   ECG 12 Lead Dyspnea    Collection Time: 12/28/24  9:42 AM   Result Value Ref Range    QT Interval 367 ms    QTC Interval 441 ms   COVID-19, FLU A/B, RSV PCR 1 HR TAT - Swab, Nasopharynx    Collection Time: 12/28/24 10:10 AM    Specimen: Nasopharynx; Swab   Result Value Ref Range    COVID19 Detected (C) Not Detected - Ref. Range    Influenza A PCR Not Detected Not Detected    Influenza B PCR Not Detected Not Detected    RSV, PCR Not Detected Not Detected   Basic Metabolic Panel    Collection  Time: 12/28/24 11:24 AM    Specimen: Blood   Result Value Ref Range    Glucose 104 (H) 65 - 99 mg/dL    BUN 19 6 - 20 mg/dL    Creatinine 0.74 0.57 - 1.00 mg/dL    Sodium 138 136 - 145 mmol/L    Potassium 4.2 3.5 - 5.2 mmol/L    Chloride 100 98 - 107 mmol/L    CO2 23.0 22.0 - 29.0 mmol/L    Calcium 9.7 8.6 - 10.5 mg/dL    BUN/Creatinine Ratio 25.7 (H) 7.0 - 25.0    Anion Gap 15.0 5.0 - 15.0 mmol/L    eGFR 95.1 >60.0 mL/min/1.73   CBC Auto Differential    Collection Time: 12/28/24 11:24 AM    Specimen: Blood   Result Value Ref Range    WBC 7.73 3.40 - 10.80 10*3/mm3    RBC 3.64 (L) 3.77 - 5.28 10*6/mm3    Hemoglobin 14.2 12.0 - 15.9 g/dL    Hematocrit 40.3 34.0 - 46.6 %    .7 (H) 79.0 - 97.0 fL    MCH 39.0 (H) 26.6 - 33.0 pg    MCHC 35.2 31.5 - 35.7 g/dL    RDW 13.9 12.3 - 15.4 %    RDW-SD 57.5 (H) 37.0 - 54.0 fl    MPV 10.0 6.0 - 12.0 fL    Platelets 171 140 - 450 10*3/mm3    Neutrophil % 79.3 (H) 42.7 - 76.0 %    Lymphocyte % 15.8 (L) 19.6 - 45.3 %    Monocyte % 3.8 (L) 5.0 - 12.0 %    Eosinophil % 0.4 0.3 - 6.2 %    Basophil % 0.1 0.0 - 1.5 %    Immature Grans % 0.6 (H) 0.0 - 0.5 %    Neutrophils, Absolute 6.13 1.70 - 7.00 10*3/mm3    Lymphocytes, Absolute 1.22 0.70 - 3.10 10*3/mm3    Monocytes, Absolute 0.29 0.10 - 0.90 10*3/mm3    Eosinophils, Absolute 0.03 0.00 - 0.40 10*3/mm3    Basophils, Absolute 0.01 0.00 - 0.20 10*3/mm3    Immature Grans, Absolute 0.05 0.00 - 0.05 10*3/mm3    nRBC 0.0 0.0 - 0.2 /100 WBC   Procalcitonin    Collection Time: 12/28/24 11:24 AM    Specimen: Blood   Result Value Ref Range    Procalcitonin 0.07 0.00 - 0.25 ng/mL     XR Chest 1 View    Result Date: 12/28/2024  Impression: Mild patchy opacities in the left perihilar region/left lower lung. This could represent atelectasis or pneumonia. Electronically Signed: Prashanth Blanco DO  12/28/2024 10:49 AM EST  Workstation ID: [id number]                                                    Medical Decision Making  Chest x-ray  interpretation shows no cardiomegaly effusion or infiltrate.  CBC shows no leukocytosis no left shift and no anemia.  BMP shows no renal sufficiency or electrolyte abnormality.  Rester panel is positive for COVID.  Patient had a breathing treatment with minimal period.  She is mildly hypoxic 89% on room air.  She will be admitted for further .  I did speak the on-call hospitalist.    Amount and/or Complexity of Data Reviewed  Labs: ordered. Decision-making details documented in ED Course.  Radiology: ordered and independent interpretation performed.  ECG/medicine tests: ordered and independent interpretation performed.    Risk  Decision regarding hospitalization.        Final diagnoses:   COVID   COPD exacerbation   Hypoxia       ED Disposition  ED Disposition       ED Disposition   Decision to Admit    Condition   --    Comment   Level of Care: Telemetry [5]   Diagnosis: COVID [[id number]]   Certification: I Certify That Inpatient Hospital Services Are Medically Necessary For Greater Than 2 Midnights                 No follow-up provider specified.       Medication List      No changes were made to your prescriptions during this visit.            Riky Evans MD  12/28/24 2207

## 2024-12-28 NOTE — Clinical Note
Level of Care: Telemetry [5]   Diagnosis: COVID [[id number]]   Certification: I Certify That Inpatient Hospital Services Are Medically Necessary For Greater Than 2 Midnights

## 2024-12-28 NOTE — H&P
History and Physical   Kamini Graham : 1970 MRN:[id number] LOS:0     Reason for admission: COVID     Assessment / Plan     #Dyspnea secondary to likely COPD exacerbation precipitated by COVID-19 infection  -Patient presented with dyspnea with cough for past 2-day prior to admission.  She is active chronic smoker.  -No leukocytosis.  Procalcitonin pending.  -Tested positive for COVID-19.  -Chest x-ray with patchy opacity in left perihilar region and left lower lobe.  -Paxlovid and respiratory treatment oxygen as needed.  -Nicotine patch    #HTN  #HLD  -Resume home medication once verified by pharmacy and clinically appropriate      Code Status (Patient has no pulse and is not breathing): CPR (Attempt to Resuscitate)  Medical Interventions (Patient has pulse or is breathing): Full Support       Nutrition: Diet: Regular/House; Fluid Consistency: Thin (IDDSI 0)     DVT Prophylaxis: Active VTE Prophylaxis  Mechanical:        Start        24 1135  Maintain Sequential Compression Device  Continuous                          Select Pharmacologic VTE Prophylaxis if Desired & Appropriate      History of Present illness     A 54 y.o. old female patient with PMH of COPD hypertension hyperlipidemia CVA presents to the hospital with complaints of shortness of breath and coughing with sputum for past 2 days prior to admission.  Patient is active chronic smoker. No significant leukocytosis.  Positive for COVID-19.  Chest x-ray with mild patchy opacity in left perihilar region and left lower lobe.  Procalcitonin pending.  She is allergic to steroid making her agitated and tachycardia.  Does not want any steroid  related medicine.  Insomnia patient is stable in the room air and not in distress.  Will start on Paxlovid along with respiratory treatment.    Admitted for COVID-19.    Subjective / Review of systems     Review of Systems   SOB when she is moving and lie down     Past Medical/Surgical/Social/Family History &  Allergies     Past Medical History:   Diagnosis Date    COPD (chronic obstructive pulmonary disease)     Hyperlipidemia     Hypertension     Stroke       Past Surgical History:   Procedure Laterality Date    HYSTERECTOMY        Social History     Socioeconomic History    Marital status:    Tobacco Use    Smoking status: Every Day     Current packs/day: 2.00     Types: Cigarettes    Smokeless tobacco: Never   Substance and Sexual Activity    Alcohol use: Yes     Alcohol/week: 3.0 standard drinks of alcohol     Types: 3 Shots of liquor per week    Drug use: No    Sexual activity: Yes      Family History   Family history unknown: Yes      Allergies   Allergen Reactions    Iodinated Contrast Media Anaphylaxis    Methylprednisolone Palpitations    Prednisone Palpitations        Home Medications     Prior to Admission medications    Medication Sig Start Date End Date Taking? Authorizing Provider   aspirin 81 MG EC tablet Take 81 mg by mouth Daily.    Yanelis Jimenez MD   atorvastatin (LIPITOR) 80 MG tablet Take 80 mg by mouth Every Night. 9/15/16   Yanelis Jimenez MD   buPROPion XL (WELLBUTRIN XL) 150 MG 24 hr tablet  3/29/22   Yanelis Jimenez MD   cloNIDine (CATAPRES) 0.2 MG tablet  3/29/22   Yanelis Jimenez MD   fenofibrate 160 MG tablet  3/29/22   Yanelis Jimenez MD   HYDROcodone-acetaminophen (NORCO)  MG per tablet Take 1 tablet by mouth 5 (Five) Times a Day As Needed for Moderate Pain .    Yanelis Jimenez MD   losartan-hydrochlorothiazide (HYZAAR) 50-12.5 MG per tablet Take 1 tablet by mouth Daily.    Yanelis Jimenez MD   lubiprostone (AMITIZA) 8 MCG capsule  3/29/22   Yanelis Jimenez MD   metoprolol succinate XL (Toprol XL) 25 MG 24 hr tablet Take 1 tablet by mouth Daily for 30 days. 11/3/21 12/3/21  Prabhu Duke MD   montelukast (SINGULAIR) 10 MG tablet  3/29/22   Yanelis Jimenez MD   Omega-3 Fatty Acids (fish oil) 1000 MG capsule capsule  Take  by mouth Daily With Breakfast.    Yanelis Jimenez MD   pantoprazole (PROTONIX) 40 MG EC tablet Take 40 mg by mouth Daily.    Yanelis Jimenez MD   potassium chloride 10 MEQ CR tablet  3/29/22   Yanelis Jimenez MD   theophylline (THEODUR) 300 MG 12 hr tablet  4/6/22   Yanelis Jimenez MD   tiotropium (Spiriva HandiHaler) 18 MCG per inhalation capsule Place 1 capsule into inhaler and inhale Daily for 30 days. 10/1/21 10/31/21  Iron Ortega MD      Objective / Physical Exam   Vital signs:  Temp: 97.7 °F (36.5 °C)  BP: 137/87  Heart Rate: 72  Resp: 24  SpO2: 91 %  Weight: 63.3 kg (139 lb 8.8 oz)    Admission Weight: Weight: 63.3 kg (139 lb 8.8 oz)    Physical Exam   Physical Exam  HENT:      Head: Normocephalic and atraumatic.      Nose: Nose normal.   Eyes:      Extraocular Movements: Extraocular movements intact.      Conjunctivae/sclera: Conjunctivae normal.      Pupils: Pupils are equal, round, and reactive to light.   Cardiovascular:      Rate and Rhythm: normal       Pulses: Normal pulses.      Heart sounds: Normal heart sounds.   Pulmonary:      Mild bilateral rhonchi  Abdominal:      General: Abdomen is flat. Bowel sounds are normal.      Palpations: Abdomen is soft.   Musculoskeletal:         General: Normal range of motion.      Cervical back: Normal range of motion and neck supple.   Skin:     General: Skin is dry.   Neurological:      General: No focal deficit present.      Mental Status: alert.   Psychiatric:         Mood and Affect: Mood normal.        Labs     Results from last 7 days   Lab Units 12/28/24  1124   WBC 10*3/mm3 7.73   HEMATOCRIT % 40.3   PLATELETS 10*3/mm3 171             Current Medications   Scheduled Meds:ipratropium-albuterol, 3 mL, Nebulization, 4x Daily - RT  sodium chloride, 10 mL, Intravenous, Q12H         Continuous Infusions:      Gabrielle Alvarez MD  [facility]   12/28/24   11:39 EST

## 2024-12-29 LAB
ALBUMIN SERPL-MCNC: 4.4 G/DL (ref 3.5–5.2)
ALP SERPL-CCNC: 112 U/L (ref 39–117)
ALT SERPL W P-5'-P-CCNC: 29 U/L (ref 1–33)
ANION GAP SERPL CALCULATED.3IONS-SCNC: 14 MMOL/L (ref 5–15)
AST SERPL-CCNC: 36 U/L (ref 1–32)
BASOPHILS # BLD AUTO: 0.01 10*3/MM3 (ref 0–0.2)
BASOPHILS NFR BLD AUTO: 0.1 % (ref 0–1.5)
BILIRUB CONJ SERPL-MCNC: 0.3 MG/DL (ref 0–0.3)
BILIRUB INDIRECT SERPL-MCNC: 0.5 MG/DL
BILIRUB SERPL-MCNC: 0.8 MG/DL (ref 0–1.2)
BUN SERPL-MCNC: 15 MG/DL (ref 6–20)
BUN/CREAT SERPL: 21.4 (ref 7–25)
CALCIUM SPEC-SCNC: 9.4 MG/DL (ref 8.6–10.5)
CHLORIDE SERPL-SCNC: 96 MMOL/L (ref 98–107)
CO2 SERPL-SCNC: 26 MMOL/L (ref 22–29)
CREAT SERPL-MCNC: 0.7 MG/DL (ref 0.57–1)
DEPRECATED RDW RBC AUTO: 55.1 FL (ref 37–54)
EGFRCR SERPLBLD CKD-EPI 2021: 101.6 ML/MIN/1.73
EOSINOPHIL # BLD AUTO: 0.01 10*3/MM3 (ref 0–0.4)
EOSINOPHIL NFR BLD AUTO: 0.1 % (ref 0.3–6.2)
ERYTHROCYTE [DISTWIDTH] IN BLOOD BY AUTOMATED COUNT: 13.6 % (ref 12.3–15.4)
GLUCOSE SERPL-MCNC: 119 MG/DL (ref 65–99)
HCT VFR BLD AUTO: 37.2 % (ref 34–46.6)
HGB BLD-MCNC: 13.2 G/DL (ref 12–15.9)
IMM GRANULOCYTES # BLD AUTO: 0.05 10*3/MM3 (ref 0–0.05)
IMM GRANULOCYTES NFR BLD AUTO: 0.6 % (ref 0–0.5)
LYMPHOCYTES # BLD AUTO: 0.74 10*3/MM3 (ref 0.7–3.1)
LYMPHOCYTES NFR BLD AUTO: 8.5 % (ref 19.6–45.3)
MAGNESIUM SERPL-MCNC: 1.2 MG/DL (ref 1.6–2.6)
MCH RBC QN AUTO: 38.3 PG (ref 26.6–33)
MCHC RBC AUTO-ENTMCNC: 35.5 G/DL (ref 31.5–35.7)
MCV RBC AUTO: 107.8 FL (ref 79–97)
MONOCYTES # BLD AUTO: 0.5 10*3/MM3 (ref 0.1–0.9)
MONOCYTES NFR BLD AUTO: 5.7 % (ref 5–12)
NEUTROPHILS NFR BLD AUTO: 7.39 10*3/MM3 (ref 1.7–7)
NEUTROPHILS NFR BLD AUTO: 85 % (ref 42.7–76)
NRBC BLD AUTO-RTO: 0 /100 WBC (ref 0–0.2)
PHOSPHATE SERPL-MCNC: 3.1 MG/DL (ref 2.5–4.5)
PLATELET # BLD AUTO: 163 10*3/MM3 (ref 140–450)
PMV BLD AUTO: 10.4 FL (ref 6–12)
POTASSIUM SERPL-SCNC: 3.5 MMOL/L (ref 3.5–5.2)
PROT SERPL-MCNC: 7.4 G/DL (ref 6–8.5)
QT INTERVAL: 367 MS
QTC INTERVAL: 441 MS
RBC # BLD AUTO: 3.45 10*6/MM3 (ref 3.77–5.28)
SODIUM SERPL-SCNC: 136 MMOL/L (ref 136–145)
WBC NRBC COR # BLD AUTO: 8.7 10*3/MM3 (ref 3.4–10.8)

## 2024-12-29 PROCEDURE — 94760 N-INVAS EAR/PLS OXIMETRY 1: CPT

## 2024-12-29 PROCEDURE — 63710000001 ONDANSETRON ODT 4 MG TABLET DISPERSIBLE: Performed by: INTERNAL MEDICINE

## 2024-12-29 PROCEDURE — 94799 UNLISTED PULMONARY SVC/PX: CPT

## 2024-12-29 PROCEDURE — 25010000002 ONDANSETRON PER 1 MG: Performed by: INTERNAL MEDICINE

## 2024-12-29 PROCEDURE — 25010000002 REMDESIVIR 100 MG RECONSTITUTED SOLUTION: Performed by: HOSPITALIST

## 2024-12-29 PROCEDURE — 83735 ASSAY OF MAGNESIUM: CPT | Performed by: INTERNAL MEDICINE

## 2024-12-29 PROCEDURE — 80076 HEPATIC FUNCTION PANEL: CPT | Performed by: HOSPITALIST

## 2024-12-29 PROCEDURE — 94761 N-INVAS EAR/PLS OXIMETRY MLT: CPT

## 2024-12-29 PROCEDURE — XW033E5 INTRODUCTION OF REMDESIVIR ANTI-INFECTIVE INTO PERIPHERAL VEIN, PERCUTANEOUS APPROACH, NEW TECHNOLOGY GROUP 5: ICD-10-PCS | Performed by: HOSPITALIST

## 2024-12-29 PROCEDURE — 84100 ASSAY OF PHOSPHORUS: CPT | Performed by: INTERNAL MEDICINE

## 2024-12-29 PROCEDURE — 85025 COMPLETE CBC W/AUTO DIFF WBC: CPT | Performed by: INTERNAL MEDICINE

## 2024-12-29 PROCEDURE — 80048 BASIC METABOLIC PNL TOTAL CA: CPT | Performed by: INTERNAL MEDICINE

## 2024-12-29 PROCEDURE — 94664 DEMO&/EVAL PT USE INHALER: CPT

## 2024-12-29 PROCEDURE — 25810000003 SODIUM CHLORIDE 0.9 % SOLUTION: Performed by: HOSPITALIST

## 2024-12-29 PROCEDURE — 25010000002 MAGNESIUM SULFATE 4 GM/100ML SOLUTION: Performed by: HOSPITALIST

## 2024-12-29 RX ORDER — ATORVASTATIN CALCIUM 40 MG/1
80 TABLET, FILM COATED ORAL NIGHTLY
Status: DISCONTINUED | OUTPATIENT
Start: 2024-12-29 | End: 2024-12-30 | Stop reason: HOSPADM

## 2024-12-29 RX ORDER — LABETALOL HYDROCHLORIDE 5 MG/ML
10 INJECTION, SOLUTION INTRAVENOUS EVERY 4 HOURS PRN
Status: DISCONTINUED | OUTPATIENT
Start: 2024-12-29 | End: 2024-12-30 | Stop reason: HOSPADM

## 2024-12-29 RX ORDER — ECHINACEA PURPUREA EXTRACT 125 MG
1 TABLET ORAL AS NEEDED
Status: DISCONTINUED | OUTPATIENT
Start: 2024-12-29 | End: 2024-12-30 | Stop reason: HOSPADM

## 2024-12-29 RX ORDER — MAGNESIUM SULFATE HEPTAHYDRATE 40 MG/ML
4 INJECTION, SOLUTION INTRAVENOUS ONCE
Status: COMPLETED | OUTPATIENT
Start: 2024-12-29 | End: 2024-12-29

## 2024-12-29 RX ADMIN — LOSARTAN POTASSIUM 50 MG: 50 TABLET, FILM COATED ORAL at 09:20

## 2024-12-29 RX ADMIN — NICOTINE 1 PATCH: 21 PATCH TRANSDERMAL at 09:20

## 2024-12-29 RX ADMIN — HYDROCHLOROTHIAZIDE 12.5 MG: 12.5 TABLET ORAL at 09:20

## 2024-12-29 RX ADMIN — HYDROCODONE BITARTRATE AND ACETAMINOPHEN 1 TABLET: 10; 325 TABLET ORAL at 17:11

## 2024-12-29 RX ADMIN — PANTOPRAZOLE SODIUM 40 MG: 40 TABLET, DELAYED RELEASE ORAL at 09:20

## 2024-12-29 RX ADMIN — HYDROCODONE BITARTRATE AND ACETAMINOPHEN 1 TABLET: 10; 325 TABLET ORAL at 06:14

## 2024-12-29 RX ADMIN — ALBUTEROL SULFATE 2 PUFF: 108 AEROSOL, METERED RESPIRATORY (INHALATION) at 15:15

## 2024-12-29 RX ADMIN — ALBUTEROL SULFATE 2 PUFF: 108 AEROSOL, METERED RESPIRATORY (INHALATION) at 08:10

## 2024-12-29 RX ADMIN — REMDESIVIR 200 MG: 100 INJECTION, POWDER, LYOPHILIZED, FOR SOLUTION INTRAVENOUS at 14:27

## 2024-12-29 RX ADMIN — TIOTROPIUM BROMIDE INHALATION SPRAY 2 PUFF: 3.12 SPRAY, METERED RESPIRATORY (INHALATION) at 08:10

## 2024-12-29 RX ADMIN — ALBUTEROL SULFATE 2 PUFF: 108 AEROSOL, METERED RESPIRATORY (INHALATION) at 11:36

## 2024-12-29 RX ADMIN — Medication 10 ML: at 20:55

## 2024-12-29 RX ADMIN — ASPIRIN 81 MG: 81 TABLET, COATED ORAL at 09:20

## 2024-12-29 RX ADMIN — ALBUTEROL SULFATE 2 PUFF: 108 AEROSOL, METERED RESPIRATORY (INHALATION) at 20:00

## 2024-12-29 RX ADMIN — MAGNESIUM SULFATE HEPTAHYDRATE 4 G: 40 INJECTION, SOLUTION INTRAVENOUS at 11:11

## 2024-12-29 RX ADMIN — Medication 10 ML: at 09:21

## 2024-12-29 RX ADMIN — HYDROCODONE BITARTRATE AND ACETAMINOPHEN 1 TABLET: 10; 325 TABLET ORAL at 11:16

## 2024-12-29 RX ADMIN — ONDANSETRON 4 MG: 2 INJECTION INTRAMUSCULAR; INTRAVENOUS at 00:18

## 2024-12-29 RX ADMIN — Medication 800 MG: at 20:44

## 2024-12-29 RX ADMIN — Medication 800 MG: at 10:45

## 2024-12-29 RX ADMIN — ATORVASTATIN CALCIUM 80 MG: 40 TABLET, FILM COATED ORAL at 20:44

## 2024-12-29 RX ADMIN — ONDANSETRON 4 MG: 4 TABLET, ORALLY DISINTEGRATING ORAL at 06:16

## 2024-12-29 NOTE — PLAN OF CARE
Problem: Pain Acute  Goal: Optimal Pain Control and Function  Outcome: Progressing   Goal Outcome Evaluation:

## 2024-12-29 NOTE — PROGRESS NOTES
[facility] MEDICINE SERVICE  DAILY PROGRESS NOTE    NAME: Kamini Graham  : 1970  MRN: [id number]      LOS: 1 day     PROVIDER OF SERVICE: Isaac Bird MD    Chief Complaint: COVID    Subjective:     Interval History:  History taken from: patient chart RN    No significant change in shortness of breath remains on supplemental oxygen        Review of Systems:   Review of Systems  All negative except as above  Objective:     Vital Signs  Temp:  [96.7 °F (35.9 °C)-98.4 °F (36.9 °C)] 96.7 °F (35.9 °C)  Heart Rate:  [69-90] 79  Resp:  [11-23] 15  BP: (131-220)/() 134/75  Flow (L/min) (Oxygen Therapy):  [2] 2   Body mass index is 25.52 kg/m².    Physical Exam  Physical Exam  AOx3 NAD  RRR S1-S2 audible  Lungs with fair air entry  Abdomen soft nontender nondistended     Diagnostic Data    Results from last 7 days   Lab Units 24  0237   WBC 10*3/mm3 8.70   HEMOGLOBIN g/dL 13.2   HEMATOCRIT % 37.2   PLATELETS 10*3/mm3 163   GLUCOSE mg/dL 119*   CREATININE mg/dL 0.70   BUN mg/dL 15   SODIUM mmol/L 136   POTASSIUM mmol/L 3.5   AST (SGOT) U/L 36*   ALT (SGPT) U/L 29   ALK PHOS U/L 112   BILIRUBIN mg/dL 0.8   ANION GAP mmol/L 14.0       XR Chest 1 View    Result Date: 2024  Impression: Mild patchy opacities in the left perihilar region/left lower lung. This could represent atelectasis or pneumonia. Electronically Signed: Prashanth Blanco,   2024 10:49 AM EST  Workstation ID: [id number]       I reviewed the patient's new clinical results.  I reviewed the patient's new imaging results and agree with the interpretation.    Assessment/Plan:     Active and Resolved Problems  Active Hospital Problems    Diagnosis  POA    **COVID [U07.1]  Yes      Resolved Hospital Problems   No resolved problems to display.       54-year-old female with history of COPD, hypertension, hyperlipidemia, history of CVA admitted to [facility]  with shortness of breath cough    #Acute hypoxia secondary  to COVID-19 pneumonia  #COPD not in exacerbation  Patient refused steroids given reported intolerance  DC Paxlovid start IV remdesivir  Supplemental oxygen as needed to keep SpO2 more than 90  Isolation per protocol  Continue current nebs    #Hypomagnesemia  Replace IV and p.o.  Recheck levels in a.m.    #Hypertension  Continue losartan and hydrochlorothiazide  Monitor blood pressure    #History of CVA  Continue aspirin and lipitor       VTE Prophylaxis:  Mechanical VTE prophylaxis orders are present.             Disposition Planning:     Barriers to Discharge:medical workup   Anticipated Date of Discharge: 12/31  Place of Discharge: Home      Time: 45 minutes     Code Status and Medical Interventions: CPR (Attempt to Resuscitate); Full Support   Ordered at: 12/28/24 1136     Code Status (Patient has no pulse and is not breathing):    CPR (Attempt to Resuscitate)     Medical Interventions (Patient has pulse or is breathing):    Full Support       Signature: Electronically signed by Isaac Bird MD, 12/29/24, 14:05 EST.  [facility]ist [facility]

## 2024-12-30 VITALS
TEMPERATURE: 98.6 F | SYSTOLIC BLOOD PRESSURE: 104 MMHG | HEIGHT: 62 IN | BODY MASS INDEX: 25.68 KG/M2 | OXYGEN SATURATION: 91 % | RESPIRATION RATE: 21 BRPM | WEIGHT: 139.55 LBS | HEART RATE: 87 BPM | DIASTOLIC BLOOD PRESSURE: 70 MMHG

## 2024-12-30 LAB
ALBUMIN SERPL-MCNC: 4.4 G/DL (ref 3.5–5.2)
ALBUMIN/GLOB SERPL: 1.3 G/DL
ALP SERPL-CCNC: 115 U/L (ref 39–117)
ALT SERPL W P-5'-P-CCNC: 23 U/L (ref 1–33)
ANION GAP SERPL CALCULATED.3IONS-SCNC: 16.6 MMOL/L (ref 5–15)
AST SERPL-CCNC: 27 U/L (ref 1–32)
BASOPHILS # BLD AUTO: 0.02 10*3/MM3 (ref 0–0.2)
BASOPHILS NFR BLD AUTO: 0.2 % (ref 0–1.5)
BILIRUB SERPL-MCNC: 0.7 MG/DL (ref 0–1.2)
BUN SERPL-MCNC: 11 MG/DL (ref 6–20)
BUN/CREAT SERPL: 16.7 (ref 7–25)
CALCIUM SPEC-SCNC: 9.3 MG/DL (ref 8.6–10.5)
CHLORIDE SERPL-SCNC: 94 MMOL/L (ref 98–107)
CO2 SERPL-SCNC: 23.4 MMOL/L (ref 22–29)
CREAT SERPL-MCNC: 0.66 MG/DL (ref 0.57–1)
DEPRECATED RDW RBC AUTO: 54.7 FL (ref 37–54)
EGFRCR SERPLBLD CKD-EPI 2021: 103.1 ML/MIN/1.73
EOSINOPHIL # BLD AUTO: 0 10*3/MM3 (ref 0–0.4)
EOSINOPHIL NFR BLD AUTO: 0 % (ref 0.3–6.2)
ERYTHROCYTE [DISTWIDTH] IN BLOOD BY AUTOMATED COUNT: 13.6 % (ref 12.3–15.4)
GLOBULIN UR ELPH-MCNC: 3.3 GM/DL
GLUCOSE SERPL-MCNC: 107 MG/DL (ref 65–99)
HCT VFR BLD AUTO: 38.4 % (ref 34–46.6)
HGB BLD-MCNC: 13.1 G/DL (ref 12–15.9)
IMM GRANULOCYTES # BLD AUTO: 0.05 10*3/MM3 (ref 0–0.05)
IMM GRANULOCYTES NFR BLD AUTO: 0.5 % (ref 0–0.5)
LYMPHOCYTES # BLD AUTO: 0.97 10*3/MM3 (ref 0.7–3.1)
LYMPHOCYTES NFR BLD AUTO: 9.4 % (ref 19.6–45.3)
MAGNESIUM SERPL-MCNC: 2.2 MG/DL (ref 1.6–2.6)
MCH RBC QN AUTO: 37.2 PG (ref 26.6–33)
MCHC RBC AUTO-ENTMCNC: 34.1 G/DL (ref 31.5–35.7)
MCV RBC AUTO: 109.1 FL (ref 79–97)
MONOCYTES # BLD AUTO: 0.51 10*3/MM3 (ref 0.1–0.9)
MONOCYTES NFR BLD AUTO: 4.9 % (ref 5–12)
NEUTROPHILS NFR BLD AUTO: 8.8 10*3/MM3 (ref 1.7–7)
NEUTROPHILS NFR BLD AUTO: 85 % (ref 42.7–76)
NRBC BLD AUTO-RTO: 0 /100 WBC (ref 0–0.2)
PHOSPHATE SERPL-MCNC: 3.2 MG/DL (ref 2.5–4.5)
PLATELET # BLD AUTO: 160 10*3/MM3 (ref 140–450)
PMV BLD AUTO: 10.4 FL (ref 6–12)
POTASSIUM SERPL-SCNC: 3.3 MMOL/L (ref 3.5–5.2)
POTASSIUM SERPL-SCNC: 4.2 MMOL/L (ref 3.5–5.2)
PROT SERPL-MCNC: 7.7 G/DL (ref 6–8.5)
RBC # BLD AUTO: 3.52 10*6/MM3 (ref 3.77–5.28)
SODIUM SERPL-SCNC: 134 MMOL/L (ref 136–145)
WBC NRBC COR # BLD AUTO: 10.35 10*3/MM3 (ref 3.4–10.8)

## 2024-12-30 PROCEDURE — 25810000003 SODIUM CHLORIDE 0.9 % SOLUTION 250 ML FLEX CONT: Performed by: HOSPITALIST

## 2024-12-30 PROCEDURE — 80053 COMPREHEN METABOLIC PANEL: CPT | Performed by: HOSPITALIST

## 2024-12-30 PROCEDURE — 84100 ASSAY OF PHOSPHORUS: CPT | Performed by: HOSPITALIST

## 2024-12-30 PROCEDURE — 25010000002 REMDESIVIR 100 MG/20ML SOLUTION 1 EACH VIAL: Performed by: HOSPITALIST

## 2024-12-30 PROCEDURE — 83735 ASSAY OF MAGNESIUM: CPT | Performed by: HOSPITALIST

## 2024-12-30 PROCEDURE — 84132 ASSAY OF SERUM POTASSIUM: CPT | Performed by: HOSPITALIST

## 2024-12-30 PROCEDURE — 94761 N-INVAS EAR/PLS OXIMETRY MLT: CPT

## 2024-12-30 PROCEDURE — 94799 UNLISTED PULMONARY SVC/PX: CPT

## 2024-12-30 PROCEDURE — 94664 DEMO&/EVAL PT USE INHALER: CPT

## 2024-12-30 PROCEDURE — 85025 COMPLETE CBC W/AUTO DIFF WBC: CPT | Performed by: HOSPITALIST

## 2024-12-30 RX ORDER — POTASSIUM CHLORIDE 1500 MG/1
40 TABLET, EXTENDED RELEASE ORAL EVERY 4 HOURS
Status: COMPLETED | OUTPATIENT
Start: 2024-12-30 | End: 2024-12-30

## 2024-12-30 RX ORDER — ECHINACEA PURPUREA EXTRACT 125 MG
1 TABLET ORAL AS NEEDED
Status: DISCONTINUED | OUTPATIENT
Start: 2024-12-30 | End: 2024-12-30 | Stop reason: SDUPTHER

## 2024-12-30 RX ORDER — LOPERAMIDE HYDROCHLORIDE 2 MG/1
2 CAPSULE ORAL 4 TIMES DAILY PRN
Status: DISCONTINUED | OUTPATIENT
Start: 2024-12-30 | End: 2024-12-30 | Stop reason: HOSPADM

## 2024-12-30 RX ADMIN — HYDROCODONE BITARTRATE AND ACETAMINOPHEN 1 TABLET: 10; 325 TABLET ORAL at 09:32

## 2024-12-30 RX ADMIN — NICOTINE 1 PATCH: 21 PATCH TRANSDERMAL at 09:34

## 2024-12-30 RX ADMIN — HYDROCODONE BITARTRATE AND ACETAMINOPHEN 1 TABLET: 10; 325 TABLET ORAL at 04:57

## 2024-12-30 RX ADMIN — HYDROCHLOROTHIAZIDE 12.5 MG: 12.5 TABLET ORAL at 09:32

## 2024-12-30 RX ADMIN — HYDROCODONE BITARTRATE AND ACETAMINOPHEN 1 TABLET: 10; 325 TABLET ORAL at 13:49

## 2024-12-30 RX ADMIN — LOSARTAN POTASSIUM 50 MG: 50 TABLET, FILM COATED ORAL at 09:32

## 2024-12-30 RX ADMIN — POTASSIUM CHLORIDE 40 MEQ: 1500 TABLET, EXTENDED RELEASE ORAL at 04:57

## 2024-12-30 RX ADMIN — POTASSIUM CHLORIDE 40 MEQ: 1500 TABLET, EXTENDED RELEASE ORAL at 09:33

## 2024-12-30 RX ADMIN — PANTOPRAZOLE SODIUM 40 MG: 40 TABLET, DELAYED RELEASE ORAL at 09:32

## 2024-12-30 RX ADMIN — ALBUTEROL SULFATE 2 PUFF: 108 AEROSOL, METERED RESPIRATORY (INHALATION) at 11:28

## 2024-12-30 RX ADMIN — SODIUM CHLORIDE 100 MG: 9 INJECTION, SOLUTION INTRAVENOUS at 13:49

## 2024-12-30 RX ADMIN — Medication 10 ML: at 09:37

## 2024-12-30 RX ADMIN — TIOTROPIUM BROMIDE INHALATION SPRAY 2 PUFF: 3.12 SPRAY, METERED RESPIRATORY (INHALATION) at 07:00

## 2024-12-30 RX ADMIN — ALBUTEROL SULFATE 2 PUFF: 108 AEROSOL, METERED RESPIRATORY (INHALATION) at 15:21

## 2024-12-30 RX ADMIN — ASPIRIN 81 MG: 81 TABLET, COATED ORAL at 09:32

## 2024-12-30 NOTE — PLAN OF CARE
Goal Outcome Evaluation:  Plan of Care Reviewed With: patient        Progress: improving  Outcome Evaluation: Pt has rested in room in bed all shift. Pt still on 2L o2. MD ordered humidified oxygen.

## 2024-12-30 NOTE — CASE MANAGEMENT/SOCIAL WORK
Discharge Planning Assessment   [place]     Patient Name: Kamini Graham  MRN: [id number]  Today's Date: 12/30/2024    Admit Date: 12/28/2024    Plan: Return home with spouse. Watch for new home O2.   Discharge Needs Assessment       Row Name 12/30/24 1641       Living Environment    People in Home spouse;child(tomeka), adult    Name(s) of People in Home -Akshat, Son-John stays there at times    Current Living Arrangements home    Potentially Unsafe Housing Conditions none    In the past 12 months has the electric, gas, oil, or water company threatened to shut off services in your home? No    Primary Care Provided by self    Provides Primary Care For no one    Family Caregiver if Needed spouse    Family Caregiver Names Akshat    Quality of Family Relationships helpful;involved;supportive    Able to Return to Prior Arrangements yes       Resource/Environmental Concerns    Resource/Environmental Concerns none    Transportation Concerns none       Transportation Needs    In the past 12 months, has lack of transportation kept you from medical appointments or from getting medications? no    In the past 12 months, has lack of transportation kept you from meetings, work, or from getting things needed for daily living? No       Food Insecurity    Within the past 12 months, you worried that your food would run out before you got the money to buy more. Never true    Within the past 12 months, the food you bought just didn't last and you didn't have money to get more. Never true       Transition Planning    Patient/Family Anticipates Transition to home;home with family    Patient/Family Anticipated Services at Transition durable medical equipment  may need home O2    Transportation Anticipated family or friend will provide       Discharge Needs Assessment    Readmission Within the Last 30 Days no previous admission in last 30 days    Equipment Currently Used at Home bp cuff    Concerns to be Addressed denies  needs/concerns at this time;no discharge needs identified    Do you want help finding or keeping work or a job? Patient declined    Do you want help with school or training? For example, starting or completing job training or getting a high school diploma, GED or equivalent No    Anticipated Changes Related to Illness none    Equipment Needed After Discharge none    Provided Post Acute Provider List? N/A    Provided Post Acute Provider Quality & Resource List? N/A                   Discharge Plan       Row Name 12/30/24 2054       Plan    Plan Return home with spouse. Watch for new home O2.    Patient/Family in Agreement with Plan yes    Plan Comments CM contacted patient by room phone to complete CM/SDOH assessments. Pt lives at home with  Akshat and sometimes son John stays there. Pt does not drive, spouse provides transportation. Independent with ADL's. PCP and pharmacy confirmed- declined use of Meds 2 Beds program and no financial concerns r/t to meds. DME includes BP cuff. Pt currently requiring 2L O2 and does not wear at home. Denies current HHC/PT services. Spouse to provide transportation at discharge.              Demographic Summary       Row Name 12/30/24 1640       General Information    Admission Type inpatient    Arrived From emergency department    Referral Source admission list    Reason for Consult care coordination/care conference;discharge planning    Preferred Language English       Contact Information    Permission Granted to Share Info With                    Functional Status       Row Name 12/30/24 1640       Functional Status    Usual Activity Tolerance moderate    Current Activity Tolerance moderate       Functional Status, IADL    Medications independent    Meal Preparation independent    Housekeeping independent    Laundry independent    Shopping independent    If for any reason you need help with day-to-day activities such as bathing, preparing meals, shopping,  managing finances, etc., do you get the help you need? I get all the help I need       Employment/    Employment Status disabled           Megan Naegele, RN     Office Phone: [phone]  Office Cell: [phone]

## 2024-12-30 NOTE — SIGNIFICANT NOTE
12/30/24 1635   Living Situation   Current Living Arrangements home   Potentially Unsafe Housing Conditions none   Food Insecurity   Within the past 12 months, you worried that your food would run out before you got the money to buy more. Never true   Within the past 12 months, the food you bought just didn't last and you didn't have money to get more. Never true   Transportation Needs   In the past 12 months, has lack of transportation kept you from medical appointments or from getting medications? no   In the past 12 months, has lack of transportation kept you from meetings, work, or from getting things needed for daily living? No   Utilities   In the past 12 months has the electric, gas, oil, or water company threatened to shut off services in your home? No   Abuse Screen (yes response referral indicated)   Feels Unsafe at Home or Work/School no   Feels Threatened by Someone no   Does Anyone Try to Keep You From Having Contact with Others or Doing Things Outside Your Home? no   Physical Signs of Abuse Present no   Financial Resource Strain   How hard is it for you to pay for the very basics like food, housing, medical care, and heating? Not very   Employment   Do you want help finding or keeping work or a job? Patient unable to answer   Family and Community Support   If for any reason you need help with day-to-day activities such as bathing, preparing meals, shopping, managing finances, etc., do you get the help you need? I get all the help I need   How often do you feel lonely or isolated from those around you? Never   Education   Preferred Language English   Do you want help with school or training? For example, starting or completing job training or getting a high school diploma, GED or equivalent No   Physical Activity   On average, how many days per week do you engage in moderate to strenuous exercise (like a brisk walk)? 0 days   On average, how many minutes do you engage in exercise at this level? 0 min    Number of minutes of exercise per week (!) 0   Alcohol Use   Q1: How often do you have a drink containing alcohol? Monthly or l   Q2: How many drinks containing alcohol do you have on a typical day when you are drinking? 1 or 2   Q3: How often do you have six or more drinks on one occasion? Never   Stress   Do you feel stress - tense, restless, nervous, or anxious, or unable to sleep at night because your mind is troubled all the time - these days? Only a littl   Mental Health   Little interest or pleasure in doing things Over half   Feeling down, depressed, or hopeless Not at all   Disabilities   Difficulty Concentrating, Remembering or Making Decisions no   Difficulty Managing Errands Independently no

## 2024-12-30 NOTE — PLAN OF CARE
Goal Outcome Evaluation:   Patient was indicated with low potassium after blood draw so potassium replacement started. Slept throughout the night. Will continue to monitor.

## 2024-12-30 NOTE — PROGRESS NOTES
[facility] MEDICINE SERVICE  DAILY PROGRESS NOTE    NAME: Kamini Graham  : 1970  MRN: [id number]      LOS: 2 days     PROVIDER OF SERVICE: Isaac Bird MD    Chief Complaint: COVID    Subjective:     Interval History:  History taken from: patient chart RN    Dry nose breathing stable        Review of Systems:   Review of Systems  All negative except as above  Objective:     Vital Signs  Temp:  [97.2 °F (36.2 °C)-99.3 °F (37.4 °C)] 98.7 °F (37.1 °C)  Heart Rate:  [] 87  Resp:  [12-25] 12  BP: (116-148)/(75-88) 116/75  Flow (L/min) (Oxygen Therapy):  [2] 2   Body mass index is 25.52 kg/m².    Physical Exam  Physical Exam  AOx3 NAD  RRR S1-S2 audible  Lungs with fair air entry  Abdomen soft nontender nondistended           Diagnostic Data    Results from last 7 days   Lab Units 24  0221   WBC 10*3/mm3 10.35   HEMOGLOBIN g/dL 13.1   HEMATOCRIT % 38.4   PLATELETS 10*3/mm3 160   GLUCOSE mg/dL 107*   CREATININE mg/dL 0.66   BUN mg/dL 11   SODIUM mmol/L 134*   POTASSIUM mmol/L 3.3*   AST (SGOT) U/L 27   ALT (SGPT) U/L 23   ALK PHOS U/L 115   BILIRUBIN mg/dL 0.7   ANION GAP mmol/L 16.6*       No radiology results for the last day      I reviewed the patient's new clinical results.  I reviewed the patient's new imaging results and agree with the interpretation.    Assessment/Plan:     Active and Resolved Problems  Active Hospital Problems    Diagnosis  POA    **COVID [U07.1]  Yes      Resolved Hospital Problems   No resolved problems to display.       54-year-old female with history of COPD, hypertension, hyperlipidemia, history of CVA admitted to [facility]  with shortness of breath cough     #Acute hypoxia secondary to COVID-19 pneumonia  #COPD not in exacerbation  Patient refused steroids given reported intolerance  Continue IV remdesivir due to  Supplemental oxygen as needed to keep SpO2 more than 90.  Change to humidified O2  Isolation per protocol  Continue current nebs      #Hypomagnesemia  Replaced     #Hypertension  Continue losartan and hydrochlorothiazide  Monitor blood pressure     #History of CVA  Continue aspirin and lipitor     VTE Prophylaxis:  Mechanical VTE prophylaxis orders are present.             Disposition Planning:     Barriers to Discharge: Medical workup  Anticipated Date of Discharge: 1/2  Place of Discharge: Home      Time: 45 minutes     Code Status and Medical Interventions: CPR (Attempt to Resuscitate); Full Support   Ordered at: 12/28/24 1136     Code Status (Patient has no pulse and is not breathing):    CPR (Attempt to Resuscitate)     Medical Interventions (Patient has pulse or is breathing):    Full Support       Signature: Electronically signed by Isaac Bird MD, 12/30/24, 12:24 EST.  [facility] Hospitalist Team

## 2024-12-31 NOTE — CASE MANAGEMENT/SOCIAL WORK
Case Management Discharge Note      Final Note: Home    Provided Post Acute Provider List?: N/A  Provided Post Acute Provider Quality & Resource List?: N/A    Selected Continued Care - Discharged on 12/30/2024 Admission date: 12/28/2024 - Discharge disposition: Left Against Medical Advice       Transportation Services  Private: Car    Final Discharge Disposition Code: 01 - home or self-care

## 2025-01-01 NOTE — DISCHARGE SUMMARY
[facility] Medicine Services  Discharge Summary    Date of Service: 2025  Patient Name: Kamini Graham  : 1970  MRN: [id number]    Date of Admission: 2024  Discharge Diagnosis: #Acute hypoxia secondary to COVID-19 pneumonia  Date of Discharge: 2024.  Left AGAINST MEDICAL ADVICE  Primary Care Physician: Zari Church PA      Presenting Problem:   Hypoxia [R09.02]  COPD exacerbation [J44.1]  COVID [U07.1]    Active and Resolved Hospital Problems:  Active Hospital Problems    Diagnosis POA    **COVID [U07.1] Yes      Resolved Hospital Problems   No resolved problems to display.         Hospital Course     HPI:  Admitting team HPI  A 54 y.o. old female patient with PMH of COPD hypertension hyperlipidemia CVA presents to the hospital with complaints of shortness of breath and coughing with sputum for past 2 days prior to admission.  Patient is active chronic smoker. No significant leukocytosis.  Positive for COVID-19.  Chest x-ray with mild patchy opacity in left perihilar region and left lower lobe.  Procalcitonin pending.  She is allergic to steroid making her agitated and tachycardia.  Does not want any steroid  related medicine.  Insomnia patient is stable in the room air and not in distress.  Will start on Paxlovid along with respiratory treatment.     Hospital Course:  54-year-old female with history of COPD, hypertension, hyperlipidemia, history of CVA admitted to [facility]  with shortness of breath cough     #Acute hypoxia secondary to COVID-19 pneumonia  #COPD not in exacerbation  Patient refused steroids given reported intolerance  Started on IV remdesivir along with supplemental oxygen and nebs  Patient left AGAINST MEDICAL ADVICE.         #Hypomagnesemia  Replaced     #Hypertension  Continue losartan and hydrochlorothiazide       #History of CVA  Continue aspirin and lipitor             Day of Discharge     Vital Signs:       Physical  Exam:  Physical Exam   Unable to perform      Pertinent  and/or Most Recent Results     LAB RESULTS:      Lab 12/30/24  0221 12/29/24  0237 12/28/24  1124   WBC 10.35 8.70 7.73   HEMOGLOBIN 13.1 13.2 14.2   HEMATOCRIT 38.4 37.2 40.3   PLATELETS 160 163 171   NEUTROS ABS 8.80* 7.39* 6.13   IMMATURE GRANS (ABS) 0.05 0.05 0.05   LYMPHS ABS 0.97 0.74 1.22   MONOS ABS 0.51 0.50 0.29   EOS ABS 0.00 0.01 0.03   .1* 107.8* 110.7*   PROCALCITONIN  --   --  0.07         Lab 12/30/24  1243 12/30/24  0221 12/29/24 0237 12/28/24  1124   SODIUM  --  134* 136 138   POTASSIUM 4.2 3.3* 3.5 4.2   CHLORIDE  --  94* 96* 100   CO2  --  23.4 26.0 23.0   ANION GAP  --  16.6* 14.0 15.0   BUN  --  11 15 19   CREATININE  --  0.66 0.70 0.74   EGFR  --  103.1 101.6 95.1   GLUCOSE  --  107* 119* 104*   CALCIUM  --  9.3 9.4 9.7   MAGNESIUM  --  2.2 1.2*  --    PHOSPHORUS  --  3.2 3.1  --          Lab 12/30/24 0221 12/29/24  0237   TOTAL PROTEIN 7.7 7.4   ALBUMIN 4.4 4.4   GLOBULIN 3.3  --    ALT (SGPT) 23 29   AST (SGOT) 27 36*   BILIRUBIN 0.7 0.8   INDIRECT BILIRUBIN  --  0.5   BILIRUBIN DIRECT  --  0.3   ALK PHOS 115 112                     Brief Urine Lab Results       None          Microbiology Results (last 10 days)       Procedure Component Value - Date/Time    COVID-19, FLU A/B, RSV PCR 1 HR TAT - Swab, Nasopharynx [[id number]]  (Abnormal) Collected: 12/28/24 1010    Lab Status: Final result Specimen: Swab from Nasopharynx Updated: 12/28/24 1057     COVID19 Detected     Influenza A PCR Not Detected     Influenza B PCR Not Detected     RSV, PCR Not Detected    Narrative:      Fact sheet for providers: https://www.fda.gov/media/562385/download    Fact sheet for patients: https://www.fda.gov/media/513206/download    Test performed by PCR.            XR Chest 1 View    Result Date: 12/28/2024  Impression: Impression: Mild patchy opacities in the left perihilar region/left lower lung. This could represent atelectasis or pneumonia.  Electronically Signed: Prashanth Josétwyladeborah, DO  12/28/2024 10:49 AM EST  Workstation ID: [id number]             Results for orders placed during the hospital encounter of 09/30/21    Adult Transthoracic Echo Complete W/ Cont if Necessary Per Protocol    Interpretation Summary  · Estimated left ventricular EF = 60% Estimated left ventricular EF was in agreement with the calculated left ventricular EF. Left ventricular systolic function is normal.  · Left ventricular diastolic function is consistent with (grade I) impaired relaxation.  · Estimated right ventricular systolic pressure from tricuspid regurgitation is normal (<35 mmHg).      Labs Pending at Discharge:  Pending Results       None            Procedures Performed           Consults:   Consults       No orders found from 11/29/2024 to 12/29/2024.              Discharge Details        Discharge Medications        Continue These Medications        Instructions Start Date   aspirin 81 MG EC tablet   81 mg, Daily      atorvastatin 80 MG tablet  Commonly known as: LIPITOR   80 mg, Oral, Nightly      buPROPion  MG 24 hr tablet  Commonly known as: WELLBUTRIN XL   No dose, route, or frequency recorded.      cloNIDine 0.2 MG tablet  Commonly known as: CATAPRES   No dose, route, or frequency recorded.      fenofibrate 160 MG tablet   No dose, route, or frequency recorded.      fish oil 1000 MG capsule capsule   Daily With Breakfast      HYDROcodone-acetaminophen  MG per tablet  Commonly known as: NORCO   1 tablet, 5 Times Daily PRN      losartan-hydrochlorothiazide 50-12.5 MG per tablet  Commonly known as: HYZAAR   1 tablet, Daily      lubiprostone 8 MCG capsule  Commonly known as: AMITIZA   No dose, route, or frequency recorded.      metoprolol succinate XL 25 MG 24 hr tablet  Commonly known as: Toprol XL   25 mg, Oral, Daily      montelukast 10 MG tablet  Commonly known as: SINGULAIR   No dose, route, or frequency recorded.      pantoprazole 40 MG EC  tablet  Commonly known as: PROTONIX   40 mg, Daily      potassium chloride 10 MEQ CR tablet   No dose, route, or frequency recorded.      theophylline 300 MG 12 hr tablet  Commonly known as: THEODUR   No dose, route, or frequency recorded.      tiotropium 18 MCG per inhalation capsule  Commonly known as: Spiriva HandiHaler   1 capsule, Inhalation, Daily - RT               Allergies   Allergen Reactions    Iodinated Contrast Media Anaphylaxis    Methylprednisolone Palpitations    Prednisone Palpitations         Discharge Disposition  Left Against Medical Advice    Diet:  Hospital:No active diet order        Discharge Activity:         CODE STATUS:  Code Status and Medical Interventions: CPR (Attempt to Resuscitate); Full Support   Ordered at: 12/28/24 1136     Code Status (Patient has no pulse and is not breathing):    CPR (Attempt to Resuscitate)     Medical Interventions (Patient has pulse or is breathing):    Full Support         No future appointments.          Signature: Electronically signed by Isaac Bird MD, 01/01/25, 13:08 EST.  [facility] [place] Hospitalist Team

## 2025-01-20 ENCOUNTER — TRANSCRIBE ORDERS (OUTPATIENT)
Dept: ADMINISTRATIVE | Facility: HOSPITAL | Age: 55
End: 2025-01-20
Payer: MEDICAID

## 2025-01-20 DIAGNOSIS — J44.9 CHRONIC OBSTRUCTIVE PULMONARY DISEASE, UNSPECIFIED COPD TYPE: ICD-10-CM

## 2025-01-20 DIAGNOSIS — F17.210 HEAVY CIGARETTE SMOKER: Primary | ICD-10-CM

## 2025-02-10 ENCOUNTER — HOSPITAL ENCOUNTER (OUTPATIENT)
Dept: RESPIRATORY THERAPY | Facility: HOSPITAL | Age: 55
Discharge: HOME | End: 2025-02-10
Admitting: INTERNAL MEDICINE
Payer: MEDICAID

## 2025-02-10 VITALS — OXYGEN SATURATION: 94 % | HEIGHT: 61 IN | RESPIRATION RATE: 14 BRPM | BODY MASS INDEX: 26.37 KG/M2 | HEART RATE: 70 BPM

## 2025-02-10 DIAGNOSIS — J44.9 CHRONIC OBSTRUCTIVE PULMONARY DISEASE, UNSPECIFIED COPD TYPE: ICD-10-CM

## 2025-02-10 DIAGNOSIS — F17.210 HEAVY CIGARETTE SMOKER: ICD-10-CM

## 2025-02-10 PROCEDURE — 94664 DEMO&/EVAL PT USE INHALER: CPT

## 2025-02-10 PROCEDURE — 94726 PLETHYSMOGRAPHY LUNG VOLUMES: CPT

## 2025-02-10 PROCEDURE — 94729 DIFFUSING CAPACITY: CPT

## 2025-02-10 PROCEDURE — 94799 UNLISTED PULMONARY SVC/PX: CPT

## 2025-02-10 PROCEDURE — 94060 EVALUATION OF WHEEZING: CPT

## 2025-02-10 RX ORDER — ALBUTEROL SULFATE 90 UG/1
2 INHALANT RESPIRATORY (INHALATION) ONCE
Status: COMPLETED | OUTPATIENT
Start: 2025-02-10 | End: 2025-02-10

## 2025-02-10 RX ADMIN — ALBUTEROL SULFATE 2 PUFF: 108 AEROSOL, METERED RESPIRATORY (INHALATION) at 13:21

## 2025-02-13 ENCOUNTER — HOSPITAL ENCOUNTER (OUTPATIENT)
Dept: CT IMAGING | Facility: HOSPITAL | Age: 55
Discharge: HOME | End: 2025-02-13
Admitting: INTERNAL MEDICINE
Payer: MEDICAID

## 2025-02-13 DIAGNOSIS — J44.9 CHRONIC OBSTRUCTIVE PULMONARY DISEASE, UNSPECIFIED COPD TYPE: ICD-10-CM

## 2025-02-13 DIAGNOSIS — F17.210 HEAVY CIGARETTE SMOKER: ICD-10-CM

## 2025-02-13 PROCEDURE — 71271 CT THORAX LUNG CANCER SCR C-: CPT

## 2025-02-15 ENCOUNTER — APPOINTMENT (OUTPATIENT)
Dept: GENERAL RADIOLOGY | Facility: HOSPITAL | Age: 55
End: 2025-02-15
Payer: MEDICAID

## 2025-02-15 ENCOUNTER — HOSPITAL ENCOUNTER (OUTPATIENT)
Facility: HOSPITAL | Age: 55
Discharge: LEFT BEFORE DISCHARGE | End: 2025-02-16
Attending: EMERGENCY MEDICINE | Admitting: STUDENT IN AN ORGANIZED HEALTH CARE EDUCATION/TRAINING PROGRAM
Payer: MEDICAID

## 2025-02-15 DIAGNOSIS — E83.42 HYPOMAGNESEMIA: ICD-10-CM

## 2025-02-15 DIAGNOSIS — A41.9 SEPSIS, DUE TO UNSPECIFIED ORGANISM, UNSPECIFIED WHETHER ACUTE ORGAN DYSFUNCTION PRESENT: ICD-10-CM

## 2025-02-15 DIAGNOSIS — E87.6 HYPOKALEMIA: ICD-10-CM

## 2025-02-15 DIAGNOSIS — J18.9 PNEUMONIA OF LEFT LOWER LOBE DUE TO INFECTIOUS ORGANISM: Primary | ICD-10-CM

## 2025-02-15 LAB
ADV 40+41 DNA STL QL NAA+NON-PROBE: NOT DETECTED
ALBUMIN SERPL-MCNC: 3.9 G/DL (ref 3.5–5.2)
ALBUMIN/GLOB SERPL: 1.2 G/DL
ALP SERPL-CCNC: 80 U/L (ref 39–117)
ALT SERPL W P-5'-P-CCNC: 28 U/L (ref 1–33)
ANION GAP SERPL CALCULATED.3IONS-SCNC: 13.9 MMOL/L (ref 5–15)
ANION GAP SERPL CALCULATED.3IONS-SCNC: 19.7 MMOL/L (ref 5–15)
ARTERIAL PATENCY WRIST A: POSITIVE
AST SERPL-CCNC: 30 U/L (ref 1–32)
ASTRO TYP 1-8 RNA STL QL NAA+NON-PROBE: NOT DETECTED
ATMOSPHERIC PRESS: ABNORMAL MM[HG]
B PARAPERT DNA SPEC QL NAA+PROBE: NOT DETECTED
B PERT DNA SPEC QL NAA+PROBE: NOT DETECTED
BACTERIA UR QL AUTO: ABNORMAL /HPF
BASE EXCESS BLDA CALC-SCNC: -1.3 MMOL/L (ref 0–3)
BASOPHILS # BLD MANUAL: 0.07 10*3/MM3 (ref 0–0.2)
BASOPHILS # BLD MANUAL: 0.1 10*3/MM3 (ref 0–0.2)
BASOPHILS NFR BLD MANUAL: 1 % (ref 0–1.5)
BASOPHILS NFR BLD MANUAL: 1 % (ref 0–1.5)
BDY SITE: ABNORMAL
BILIRUB SERPL-MCNC: 1 MG/DL (ref 0–1.2)
BILIRUB UR QL STRIP: NEGATIVE
BUN SERPL-MCNC: 18 MG/DL (ref 6–20)
BUN SERPL-MCNC: 20 MG/DL (ref 6–20)
BUN/CREAT SERPL: 13.2 (ref 7–25)
BUN/CREAT SERPL: 22 (ref 7–25)
C CAYETANENSIS DNA STL QL NAA+NON-PROBE: NOT DETECTED
C COLI+JEJ+UPSA DNA STL QL NAA+NON-PROBE: NOT DETECTED
C PNEUM DNA NPH QL NAA+NON-PROBE: NOT DETECTED
CA-I SERPL ISE-MCNC: 0.91 MMOL/L (ref 1.15–1.3)
CALCIUM SPEC-SCNC: 7.8 MG/DL (ref 8.6–10.5)
CALCIUM SPEC-SCNC: 8.1 MG/DL (ref 8.6–10.5)
CHLORIDE SERPL-SCNC: 93 MMOL/L (ref 98–107)
CHLORIDE SERPL-SCNC: 96 MMOL/L (ref 98–107)
CLARITY UR: ABNORMAL
CO2 BLDA-SCNC: 23.3 MMOL/L (ref 22–29)
CO2 SERPL-SCNC: 17.1 MMOL/L (ref 22–29)
CO2 SERPL-SCNC: 19.3 MMOL/L (ref 22–29)
COLOR UR: ABNORMAL
CREAT SERPL-MCNC: 0.91 MG/DL (ref 0.57–1)
CREAT SERPL-MCNC: 1.36 MG/DL (ref 0.57–1)
CRP SERPL-MCNC: 52.8 MG/DL (ref 0–0.5)
CRYPTOSP DNA STL QL NAA+NON-PROBE: NOT DETECTED
D-LACTATE SERPL-SCNC: 1.2 MMOL/L (ref 0.3–2)
DEPRECATED RDW RBC AUTO: 53.3 FL (ref 37–54)
DEPRECATED RDW RBC AUTO: 56.1 FL (ref 37–54)
E HISTOLYT DNA STL QL NAA+NON-PROBE: NOT DETECTED
EAEC PAA PLAS AGGR+AATA ST NAA+NON-PRB: NOT DETECTED
EC STX1+STX2 GENES STL QL NAA+NON-PROBE: NOT DETECTED
EGFRCR SERPLBLD CKD-EPI 2021: 45.8 ML/MIN/1.73
EGFRCR SERPLBLD CKD-EPI 2021: 74.2 ML/MIN/1.73
EPEC EAE GENE STL QL NAA+NON-PROBE: NOT DETECTED
ERYTHROCYTE [DISTWIDTH] IN BLOOD BY AUTOMATED COUNT: 13.2 % (ref 12.3–15.4)
ERYTHROCYTE [DISTWIDTH] IN BLOOD BY AUTOMATED COUNT: 13.3 % (ref 12.3–15.4)
ETEC LTA+ST1A+ST1B TOX ST NAA+NON-PROBE: NOT DETECTED
FLUAV SUBTYP SPEC NAA+PROBE: NOT DETECTED
FLUBV RNA ISLT QL NAA+PROBE: NOT DETECTED
G LAMBLIA DNA STL QL NAA+NON-PROBE: NOT DETECTED
GEN 5 1HR TROPONIN T REFLEX: 12 NG/L
GLOBULIN UR ELPH-MCNC: 3.2 GM/DL
GLUCOSE SERPL-MCNC: 116 MG/DL (ref 65–99)
GLUCOSE SERPL-MCNC: 89 MG/DL (ref 65–99)
GLUCOSE UR STRIP-MCNC: NEGATIVE MG/DL
GRAN CASTS URNS QL MICRO: ABNORMAL /LPF
HADV DNA SPEC NAA+PROBE: NOT DETECTED
HCO3 BLDA-SCNC: 22.3 MMOL/L (ref 21–28)
HCOV 229E RNA SPEC QL NAA+PROBE: NOT DETECTED
HCOV HKU1 RNA SPEC QL NAA+PROBE: NOT DETECTED
HCOV NL63 RNA SPEC QL NAA+PROBE: NOT DETECTED
HCOV OC43 RNA SPEC QL NAA+PROBE: NOT DETECTED
HCT VFR BLD AUTO: 36.6 % (ref 34–46.6)
HCT VFR BLD AUTO: 41.2 % (ref 34–46.6)
HEMODILUTION: NO
HGB BLD-MCNC: 12.2 G/DL (ref 12–15.9)
HGB BLD-MCNC: 14.2 G/DL (ref 12–15.9)
HGB UR QL STRIP.AUTO: ABNORMAL
HMPV RNA NPH QL NAA+NON-PROBE: NOT DETECTED
HOLD SPECIMEN: NORMAL
HOLD SPECIMEN: NORMAL
HPIV1 RNA ISLT QL NAA+PROBE: NOT DETECTED
HPIV2 RNA SPEC QL NAA+PROBE: NOT DETECTED
HPIV3 RNA NPH QL NAA+PROBE: NOT DETECTED
HPIV4 P GENE NPH QL NAA+PROBE: NOT DETECTED
HYALINE CASTS UR QL AUTO: ABNORMAL /LPF
INHALED O2 CONCENTRATION: 28 %
KETONES UR QL STRIP: ABNORMAL
LARGE PLATELETS: ABNORMAL
LEUKOCYTE ESTERASE UR QL STRIP.AUTO: NEGATIVE
LYMPHOCYTES # BLD MANUAL: 1.22 10*3/MM3 (ref 0.7–3.1)
LYMPHOCYTES # BLD MANUAL: 1.43 10*3/MM3 (ref 0.7–3.1)
LYMPHOCYTES NFR BLD MANUAL: 1 % (ref 5–12)
LYMPHOCYTES NFR BLD MANUAL: 2 % (ref 5–12)
M PNEUMO IGG SER IA-ACNC: NOT DETECTED
MACROCYTES BLD QL SMEAR: ABNORMAL
MAGNESIUM SERPL-MCNC: 0.9 MG/DL (ref 1.6–2.6)
MAGNESIUM SERPL-MCNC: 2 MG/DL (ref 1.6–2.6)
MCH RBC QN AUTO: 37.4 PG (ref 26.6–33)
MCH RBC QN AUTO: 37.8 PG (ref 26.6–33)
MCHC RBC AUTO-ENTMCNC: 33.3 G/DL (ref 31.5–35.7)
MCHC RBC AUTO-ENTMCNC: 34.5 G/DL (ref 31.5–35.7)
MCV RBC AUTO: 109.6 FL (ref 79–97)
MCV RBC AUTO: 112.3 FL (ref 79–97)
MODALITY: ABNORMAL
MONOCYTES # BLD: 0.07 10*3/MM3 (ref 0.1–0.9)
MONOCYTES # BLD: 0.19 10*3/MM3 (ref 0.1–0.9)
MRSA DNA SPEC QL NAA+PROBE: NORMAL
NEUTROPHILS # BLD AUTO: 5.4 10*3/MM3 (ref 1.7–7)
NEUTROPHILS # BLD AUTO: 7.82 10*3/MM3 (ref 1.7–7)
NEUTROPHILS NFR BLD MANUAL: 66 % (ref 42.7–76)
NEUTROPHILS NFR BLD MANUAL: 72 % (ref 42.7–76)
NEUTS BAND NFR BLD MANUAL: 16 % (ref 0–5)
NEUTS BAND NFR BLD MANUAL: 8 % (ref 0–5)
NEUTS VAC BLD QL SMEAR: ABNORMAL
NEUTS VAC BLD QL SMEAR: ABNORMAL
NITRITE UR QL STRIP: NEGATIVE
NOROVIRUS GI+II RNA STL QL NAA+NON-PROBE: NOT DETECTED
P SHIGELLOIDES DNA STL QL NAA+NON-PROBE: NOT DETECTED
PCO2 BLDA: 33 MM HG (ref 35–48)
PH BLDA: 7.44 PH UNITS (ref 7.35–7.45)
PH UR STRIP.AUTO: 5.5 [PH] (ref 5–8)
PHOSPHATE SERPL-MCNC: 3.1 MG/DL (ref 2.5–4.5)
PLAT MORPH BLD: NORMAL
PLATELET # BLD AUTO: 165 10*3/MM3 (ref 140–450)
PLATELET # BLD AUTO: 208 10*3/MM3 (ref 140–450)
PMV BLD AUTO: 10.5 FL (ref 6–12)
PMV BLD AUTO: 10.5 FL (ref 6–12)
PO2 BLD: 251 MM[HG] (ref 0–500)
PO2 BLDA: 70.4 MM HG (ref 83–108)
POTASSIUM SERPL-SCNC: 3.2 MMOL/L (ref 3.5–5.2)
POTASSIUM SERPL-SCNC: 3.8 MMOL/L (ref 3.5–5.2)
PROCALCITONIN SERPL-MCNC: 4.24 NG/ML (ref 0–0.25)
PROT SERPL-MCNC: 7.1 G/DL (ref 6–8.5)
PROT UR QL STRIP: ABNORMAL
RBC # BLD AUTO: 3.26 10*6/MM3 (ref 3.77–5.28)
RBC # BLD AUTO: 3.76 10*6/MM3 (ref 3.77–5.28)
RBC # UR STRIP: ABNORMAL /HPF
RBC MORPH BLD: NORMAL
REF LAB TEST METHOD: ABNORMAL
RHINOVIRUS RNA SPEC NAA+PROBE: NOT DETECTED
RSV RNA NPH QL NAA+NON-PROBE: NOT DETECTED
RVA RNA STL QL NAA+NON-PROBE: NOT DETECTED
S ENT+BONG DNA STL QL NAA+NON-PROBE: NOT DETECTED
SAO2 % BLDCOA: 94.7 % (ref 94–98)
SAPO I+II+IV+V RNA STL QL NAA+NON-PROBE: NOT DETECTED
SARS-COV-2 RNA RESP QL NAA+PROBE: NOT DETECTED
SCAN SLIDE: NORMAL
SCAN SLIDE: NORMAL
SHIGELLA SP+EIEC IPAH ST NAA+NON-PROBE: NOT DETECTED
SODIUM SERPL-SCNC: 127 MMOL/L (ref 136–145)
SODIUM SERPL-SCNC: 132 MMOL/L (ref 136–145)
SP GR UR STRIP: 1.01 (ref 1–1.03)
SQUAMOUS #/AREA URNS HPF: ABNORMAL /HPF
THEOPHYLLINE SERPL-MCNC: 1 MCG/ML (ref 10–20)
TROPONIN T % DELTA: -14
TROPONIN T NUMERIC DELTA: -2 NG/L
TROPONIN T SERPL HS-MCNC: 14 NG/L
UROBILINOGEN UR QL STRIP: ABNORMAL
V CHOL+PARA+VUL DNA STL QL NAA+NON-PROBE: NOT DETECTED
V CHOLERAE DNA STL QL NAA+NON-PROBE: NOT DETECTED
VARIANT LYMPHS NFR BLD MANUAL: 15 % (ref 19.6–45.3)
VARIANT LYMPHS NFR BLD MANUAL: 18 % (ref 19.6–45.3)
WBC # UR STRIP: ABNORMAL /HPF
WBC NRBC COR # BLD AUTO: 6.75 10*3/MM3 (ref 3.4–10.8)
WBC NRBC COR # BLD AUTO: 9.54 10*3/MM3 (ref 3.4–10.8)
WHOLE BLOOD HOLD COAG: NORMAL
WHOLE BLOOD HOLD SPECIMEN: NORMAL
Y ENTEROCOL DNA STL QL NAA+NON-PROBE: NOT DETECTED

## 2025-02-15 PROCEDURE — 85025 COMPLETE CBC W/AUTO DIFF WBC: CPT

## 2025-02-15 PROCEDURE — 25010000002 MAGNESIUM SULFATE 2 GM/50ML SOLUTION: Performed by: EMERGENCY MEDICINE

## 2025-02-15 PROCEDURE — 84145 PROCALCITONIN (PCT): CPT

## 2025-02-15 PROCEDURE — 25810000003 SODIUM CHLORIDE 0.9 % SOLUTION

## 2025-02-15 PROCEDURE — 83735 ASSAY OF MAGNESIUM: CPT

## 2025-02-15 PROCEDURE — 82803 BLOOD GASES ANY COMBINATION: CPT | Performed by: EMERGENCY MEDICINE

## 2025-02-15 PROCEDURE — 81001 URINALYSIS AUTO W/SCOPE: CPT | Performed by: EMERGENCY MEDICINE

## 2025-02-15 PROCEDURE — 96372 THER/PROPH/DIAG INJ SC/IM: CPT

## 2025-02-15 PROCEDURE — 80053 COMPREHEN METABOLIC PANEL: CPT | Performed by: EMERGENCY MEDICINE

## 2025-02-15 PROCEDURE — 85007 BL SMEAR W/DIFF WBC COUNT: CPT | Performed by: EMERGENCY MEDICINE

## 2025-02-15 PROCEDURE — 36415 COLL VENOUS BLD VENIPUNCTURE: CPT

## 2025-02-15 PROCEDURE — 96365 THER/PROPH/DIAG IV INF INIT: CPT

## 2025-02-15 PROCEDURE — 84100 ASSAY OF PHOSPHORUS: CPT | Performed by: EMERGENCY MEDICINE

## 2025-02-15 PROCEDURE — 25010000002 CEFEPIME PER 500 MG

## 2025-02-15 PROCEDURE — 71045 X-RAY EXAM CHEST 1 VIEW: CPT

## 2025-02-15 PROCEDURE — 82330 ASSAY OF CALCIUM: CPT | Performed by: EMERGENCY MEDICINE

## 2025-02-15 PROCEDURE — 85025 COMPLETE CBC W/AUTO DIFF WBC: CPT | Performed by: EMERGENCY MEDICINE

## 2025-02-15 PROCEDURE — 25810000003 SODIUM CHLORIDE 0.9 % SOLUTION 250 ML FLEX CONT: Performed by: EMERGENCY MEDICINE

## 2025-02-15 PROCEDURE — 94799 UNLISTED PULMONARY SVC/PX: CPT

## 2025-02-15 PROCEDURE — 84484 ASSAY OF TROPONIN QUANT: CPT | Performed by: EMERGENCY MEDICINE

## 2025-02-15 PROCEDURE — 25010000002 CEFEPIME PER 500 MG: Performed by: EMERGENCY MEDICINE

## 2025-02-15 PROCEDURE — 25810000003 SEPSIS FLUID NS 0.9 % SOLUTION: Performed by: EMERGENCY MEDICINE

## 2025-02-15 PROCEDURE — 96368 THER/DIAG CONCURRENT INF: CPT

## 2025-02-15 PROCEDURE — 36600 WITHDRAWAL OF ARTERIAL BLOOD: CPT | Performed by: EMERGENCY MEDICINE

## 2025-02-15 PROCEDURE — 96367 TX/PROPH/DG ADDL SEQ IV INF: CPT

## 2025-02-15 PROCEDURE — 94761 N-INVAS EAR/PLS OXIMETRY MLT: CPT

## 2025-02-15 PROCEDURE — 93005 ELECTROCARDIOGRAM TRACING: CPT | Performed by: EMERGENCY MEDICINE

## 2025-02-15 PROCEDURE — 96366 THER/PROPH/DIAG IV INF ADDON: CPT

## 2025-02-15 PROCEDURE — 87205 SMEAR GRAM STAIN: CPT

## 2025-02-15 PROCEDURE — 99291 CRITICAL CARE FIRST HOUR: CPT

## 2025-02-15 PROCEDURE — 87070 CULTURE OTHR SPECIMN AEROBIC: CPT

## 2025-02-15 PROCEDURE — 25010000002 VANCOMYCIN HCL 1.25 G RECONSTITUTED SOLUTION 1 EACH VIAL: Performed by: EMERGENCY MEDICINE

## 2025-02-15 PROCEDURE — 85007 BL SMEAR W/DIFF WBC COUNT: CPT

## 2025-02-15 PROCEDURE — 25010000002 HEPARIN (PORCINE) PER 1000 UNITS

## 2025-02-15 PROCEDURE — 87507 IADNA-DNA/RNA PROBE TQ 12-25: CPT | Performed by: EMERGENCY MEDICINE

## 2025-02-15 PROCEDURE — 83735 ASSAY OF MAGNESIUM: CPT | Performed by: EMERGENCY MEDICINE

## 2025-02-15 PROCEDURE — 87040 BLOOD CULTURE FOR BACTERIA: CPT | Performed by: EMERGENCY MEDICINE

## 2025-02-15 PROCEDURE — 87641 MR-STAPH DNA AMP PROBE: CPT

## 2025-02-15 PROCEDURE — 94640 AIRWAY INHALATION TREATMENT: CPT

## 2025-02-15 PROCEDURE — 80198 ASSAY OF THEOPHYLLINE: CPT | Performed by: EMERGENCY MEDICINE

## 2025-02-15 PROCEDURE — 86140 C-REACTIVE PROTEIN: CPT | Performed by: EMERGENCY MEDICINE

## 2025-02-15 PROCEDURE — 83605 ASSAY OF LACTIC ACID: CPT

## 2025-02-15 PROCEDURE — 0202U NFCT DS 22 TRGT SARS-COV-2: CPT | Performed by: EMERGENCY MEDICINE

## 2025-02-15 RX ORDER — ACETAMINOPHEN 650 MG/1
650 SUPPOSITORY RECTAL EVERY 4 HOURS PRN
Status: DISCONTINUED | OUTPATIENT
Start: 2025-02-15 | End: 2025-02-16 | Stop reason: HOSPADM

## 2025-02-15 RX ORDER — BENZONATATE 100 MG/1
200 CAPSULE ORAL 3 TIMES DAILY PRN
Status: DISCONTINUED | OUTPATIENT
Start: 2025-02-15 | End: 2025-02-16 | Stop reason: HOSPADM

## 2025-02-15 RX ORDER — METOPROLOL SUCCINATE 25 MG/1
25 TABLET, EXTENDED RELEASE ORAL DAILY
Status: DISCONTINUED | OUTPATIENT
Start: 2025-02-16 | End: 2025-02-16 | Stop reason: HOSPADM

## 2025-02-15 RX ORDER — AMOXICILLIN 250 MG
2 CAPSULE ORAL 2 TIMES DAILY PRN
Status: DISCONTINUED | OUTPATIENT
Start: 2025-02-15 | End: 2025-02-16 | Stop reason: HOSPADM

## 2025-02-15 RX ORDER — MONTELUKAST SODIUM 10 MG/1
10 TABLET ORAL NIGHTLY
Status: DISCONTINUED | OUTPATIENT
Start: 2025-02-15 | End: 2025-02-16 | Stop reason: HOSPADM

## 2025-02-15 RX ORDER — GUAIFENESIN 600 MG/1
1200 TABLET, EXTENDED RELEASE ORAL EVERY 12 HOURS SCHEDULED
Status: DISCONTINUED | OUTPATIENT
Start: 2025-02-15 | End: 2025-02-16 | Stop reason: HOSPADM

## 2025-02-15 RX ORDER — ASPIRIN 81 MG/1
81 TABLET ORAL DAILY
Status: DISCONTINUED | OUTPATIENT
Start: 2025-02-16 | End: 2025-02-16 | Stop reason: HOSPADM

## 2025-02-15 RX ORDER — HYDROCODONE BITARTRATE AND ACETAMINOPHEN 10; 325 MG/1; MG/1
1 TABLET ORAL EVERY 6 HOURS PRN
Status: DISCONTINUED | OUTPATIENT
Start: 2025-02-15 | End: 2025-02-16 | Stop reason: HOSPADM

## 2025-02-15 RX ORDER — HYDROCHLOROTHIAZIDE 12.5 MG/1
12.5 TABLET ORAL
Status: DISCONTINUED | OUTPATIENT
Start: 2025-02-16 | End: 2025-02-16 | Stop reason: HOSPADM

## 2025-02-15 RX ORDER — ALBUTEROL SULFATE 90 UG/1
2 INHALANT RESPIRATORY (INHALATION) EVERY 4 HOURS PRN
COMMUNITY

## 2025-02-15 RX ORDER — ONDANSETRON 8 MG/1
8 TABLET, ORALLY DISINTEGRATING ORAL EVERY 8 HOURS PRN
COMMUNITY

## 2025-02-15 RX ORDER — ATORVASTATIN CALCIUM 40 MG/1
80 TABLET, FILM COATED ORAL NIGHTLY
Status: DISCONTINUED | OUTPATIENT
Start: 2025-02-15 | End: 2025-02-16 | Stop reason: HOSPADM

## 2025-02-15 RX ORDER — ERGOCALCIFEROL 1.25 MG/1
50000 CAPSULE, LIQUID FILLED ORAL WEEKLY
COMMUNITY

## 2025-02-15 RX ORDER — ACETAMINOPHEN 160 MG/5ML
650 SOLUTION ORAL EVERY 4 HOURS PRN
Status: DISCONTINUED | OUTPATIENT
Start: 2025-02-15 | End: 2025-02-16 | Stop reason: HOSPADM

## 2025-02-15 RX ORDER — SODIUM CHLORIDE 9 MG/ML
100 INJECTION, SOLUTION INTRAVENOUS CONTINUOUS
Status: DISPENSED | OUTPATIENT
Start: 2025-02-15 | End: 2025-02-16

## 2025-02-15 RX ORDER — ACETAMINOPHEN 325 MG/1
650 TABLET ORAL EVERY 4 HOURS PRN
Status: DISCONTINUED | OUTPATIENT
Start: 2025-02-15 | End: 2025-02-16 | Stop reason: HOSPADM

## 2025-02-15 RX ORDER — ONDANSETRON 4 MG/1
4 TABLET, ORALLY DISINTEGRATING ORAL EVERY 6 HOURS PRN
Status: DISCONTINUED | OUTPATIENT
Start: 2025-02-15 | End: 2025-02-16 | Stop reason: HOSPADM

## 2025-02-15 RX ORDER — SODIUM CHLORIDE 9 MG/ML
40 INJECTION, SOLUTION INTRAVENOUS AS NEEDED
Status: DISCONTINUED | OUTPATIENT
Start: 2025-02-15 | End: 2025-02-16 | Stop reason: HOSPADM

## 2025-02-15 RX ORDER — TIOTROPIUM BROMIDE INHALATION SPRAY 3.12 UG/1
2 SPRAY, METERED RESPIRATORY (INHALATION)
COMMUNITY

## 2025-02-15 RX ORDER — MAGNESIUM SULFATE HEPTAHYDRATE 40 MG/ML
2 INJECTION, SOLUTION INTRAVENOUS ONCE
Status: COMPLETED | OUTPATIENT
Start: 2025-02-15 | End: 2025-02-15

## 2025-02-15 RX ORDER — IPRATROPIUM BROMIDE AND ALBUTEROL SULFATE 2.5; .5 MG/3ML; MG/3ML
3 SOLUTION RESPIRATORY (INHALATION)
Status: DISCONTINUED | OUTPATIENT
Start: 2025-02-15 | End: 2025-02-16 | Stop reason: HOSPADM

## 2025-02-15 RX ORDER — ALBUTEROL SULFATE 0.83 MG/ML
2.5 SOLUTION RESPIRATORY (INHALATION) EVERY 4 HOURS PRN
Status: DISCONTINUED | OUTPATIENT
Start: 2025-02-15 | End: 2025-02-16 | Stop reason: HOSPADM

## 2025-02-15 RX ORDER — POTASSIUM CHLORIDE 1500 MG/1
40 TABLET, EXTENDED RELEASE ORAL ONCE
Status: COMPLETED | OUTPATIENT
Start: 2025-02-15 | End: 2025-02-15

## 2025-02-15 RX ORDER — LOSARTAN POTASSIUM 50 MG/1
50 TABLET ORAL
Status: DISCONTINUED | OUTPATIENT
Start: 2025-02-16 | End: 2025-02-16 | Stop reason: HOSPADM

## 2025-02-15 RX ORDER — SODIUM CHLORIDE 0.9 % (FLUSH) 0.9 %
10 SYRINGE (ML) INJECTION AS NEEDED
Status: DISCONTINUED | OUTPATIENT
Start: 2025-02-15 | End: 2025-02-16 | Stop reason: HOSPADM

## 2025-02-15 RX ORDER — PANTOPRAZOLE SODIUM 40 MG/1
40 TABLET, DELAYED RELEASE ORAL 2 TIMES DAILY
Status: DISCONTINUED | OUTPATIENT
Start: 2025-02-15 | End: 2025-02-16 | Stop reason: HOSPADM

## 2025-02-15 RX ORDER — METOPROLOL SUCCINATE 25 MG/1
25 TABLET, EXTENDED RELEASE ORAL DAILY
COMMUNITY

## 2025-02-15 RX ORDER — HEPARIN SODIUM 5000 [USP'U]/ML
5000 INJECTION, SOLUTION INTRAVENOUS; SUBCUTANEOUS EVERY 8 HOURS SCHEDULED
Status: DISCONTINUED | OUTPATIENT
Start: 2025-02-15 | End: 2025-02-16 | Stop reason: HOSPADM

## 2025-02-15 RX ORDER — NICOTINE 21 MG/24HR
1 PATCH, TRANSDERMAL 24 HOURS TRANSDERMAL EVERY 24 HOURS
Status: DISCONTINUED | OUTPATIENT
Start: 2025-02-15 | End: 2025-02-16 | Stop reason: HOSPADM

## 2025-02-15 RX ORDER — SODIUM CHLORIDE 0.9 % (FLUSH) 0.9 %
10 SYRINGE (ML) INJECTION EVERY 12 HOURS SCHEDULED
Status: DISCONTINUED | OUTPATIENT
Start: 2025-02-15 | End: 2025-02-16 | Stop reason: HOSPADM

## 2025-02-15 RX ORDER — ONDANSETRON 2 MG/ML
4 INJECTION INTRAMUSCULAR; INTRAVENOUS EVERY 6 HOURS PRN
Status: DISCONTINUED | OUTPATIENT
Start: 2025-02-15 | End: 2025-02-16 | Stop reason: HOSPADM

## 2025-02-15 RX ORDER — BISACODYL 10 MG
10 SUPPOSITORY, RECTAL RECTAL DAILY PRN
Status: DISCONTINUED | OUTPATIENT
Start: 2025-02-15 | End: 2025-02-16 | Stop reason: HOSPADM

## 2025-02-15 RX ORDER — BISACODYL 5 MG/1
5 TABLET, DELAYED RELEASE ORAL DAILY PRN
Status: DISCONTINUED | OUTPATIENT
Start: 2025-02-15 | End: 2025-02-16 | Stop reason: HOSPADM

## 2025-02-15 RX ORDER — POLYETHYLENE GLYCOL 3350 17 G/17G
17 POWDER, FOR SOLUTION ORAL DAILY PRN
Status: DISCONTINUED | OUTPATIENT
Start: 2025-02-15 | End: 2025-02-16 | Stop reason: HOSPADM

## 2025-02-15 RX ADMIN — Medication 10 ML: at 20:19

## 2025-02-15 RX ADMIN — HYDROCODONE BITARTRATE AND ACETAMINOPHEN 1 TABLET: 10; 325 TABLET ORAL at 18:10

## 2025-02-15 RX ADMIN — MAGNESIUM SULFATE HEPTAHYDRATE 2 G: 40 INJECTION, SOLUTION INTRAVENOUS at 05:51

## 2025-02-15 RX ADMIN — SODIUM CHLORIDE 100 ML/HR: 9 INJECTION, SOLUTION INTRAVENOUS at 15:10

## 2025-02-15 RX ADMIN — SODIUM CHLORIDE 1860 ML: 9 INJECTION, SOLUTION INTRAVENOUS at 04:22

## 2025-02-15 RX ADMIN — Medication 10 ML: at 11:43

## 2025-02-15 RX ADMIN — ATORVASTATIN CALCIUM 80 MG: 40 TABLET, FILM COATED ORAL at 23:48

## 2025-02-15 RX ADMIN — HYDROCODONE BITARTRATE AND ACETAMINOPHEN 1 TABLET: 10; 325 TABLET ORAL at 11:43

## 2025-02-15 RX ADMIN — PANTOPRAZOLE SODIUM 40 MG: 40 TABLET, DELAYED RELEASE ORAL at 23:48

## 2025-02-15 RX ADMIN — HEPARIN SODIUM 5000 UNITS: 5000 INJECTION INTRAVENOUS; SUBCUTANEOUS at 11:41

## 2025-02-15 RX ADMIN — VANCOMYCIN HYDROCHLORIDE 1250 MG: 1.25 INJECTION, POWDER, LYOPHILIZED, FOR SOLUTION INTRAVENOUS at 05:45

## 2025-02-15 RX ADMIN — POTASSIUM CHLORIDE 40 MEQ: 20 TABLET, EXTENDED RELEASE ORAL at 05:37

## 2025-02-15 RX ADMIN — GUAIFENESIN 1200 MG: 600 TABLET, MULTILAYER, EXTENDED RELEASE ORAL at 20:18

## 2025-02-15 RX ADMIN — IPRATROPIUM BROMIDE AND ALBUTEROL SULFATE 3 ML: .5; 3 SOLUTION RESPIRATORY (INHALATION) at 18:28

## 2025-02-15 RX ADMIN — NICOTINE 1 PATCH: 14 PATCH TRANSDERMAL at 12:19

## 2025-02-15 RX ADMIN — CEFEPIME 2000 MG: 2 INJECTION, POWDER, FOR SOLUTION INTRAVENOUS at 16:12

## 2025-02-15 RX ADMIN — HYDROCODONE BITARTRATE AND ACETAMINOPHEN 1 TABLET: 10; 325 TABLET ORAL at 23:48

## 2025-02-15 RX ADMIN — CEFEPIME 2000 MG: 2 INJECTION, POWDER, FOR SOLUTION INTRAVENOUS at 04:27

## 2025-02-15 RX ADMIN — GUAIFENESIN 1200 MG: 600 TABLET, MULTILAYER, EXTENDED RELEASE ORAL at 11:43

## 2025-02-15 RX ADMIN — HEPARIN SODIUM 5000 UNITS: 5000 INJECTION INTRAVENOUS; SUBCUTANEOUS at 19:22

## 2025-02-15 RX ADMIN — IPRATROPIUM BROMIDE AND ALBUTEROL SULFATE 3 ML: .5; 3 SOLUTION RESPIRATORY (INHALATION) at 14:40

## 2025-02-15 RX ADMIN — MONTELUKAST 10 MG: 10 TABLET, FILM COATED ORAL at 23:48

## 2025-02-15 NOTE — ED PROVIDER NOTES
Subjective   History of Present Illness  54-year-old female who has been ill since Thursday night with vomiting, diarrhea, dyspnea, cough, frontal headache, fever, nonspecific anterior chest pressure.      Review of Systems   Constitutional:  Positive for fatigue and fever.   Respiratory:  Positive for cough and shortness of breath.    Cardiovascular:  Positive for chest pain.   Gastrointestinal:  Positive for diarrhea, nausea and vomiting.       Past Medical History:   Diagnosis Date    COPD (chronic obstructive pulmonary disease)     Hyperlipidemia     Hypertension     Stroke        Allergies   Allergen Reactions    Iodinated Contrast Media Anaphylaxis    Methylprednisolone Palpitations    Prednisone Palpitations       Past Surgical History:   Procedure Laterality Date    HYSTERECTOMY         Family History   Family history unknown: Yes       Social History     Socioeconomic History    Marital status:    Tobacco Use    Smoking status: Every Day     Current packs/day: 2.00     Types: Cigarettes    Smokeless tobacco: Never   Vaping Use    Vaping status: Never Used   Substance and Sexual Activity    Alcohol use: Yes     Alcohol/week: 3.0 standard drinks of alcohol     Types: 3 Shots of liquor per week    Drug use: No    Sexual activity: Yes           Objective   Physical Exam  Constitutional:       Appearance: She is well-developed.   HENT:      Head: Normocephalic and atraumatic.   Eyes:      Conjunctiva/sclera: Conjunctivae normal.      Pupils: Pupils are equal, round, and reactive to light.   Cardiovascular:      Rate and Rhythm: Tachycardia present.   Pulmonary:      Effort: Pulmonary effort is normal.      Breath sounds: Normal breath sounds.   Abdominal:      General: Bowel sounds are normal. There is no distension.      Palpations: Abdomen is soft.      Tenderness: There is no abdominal tenderness.   Musculoskeletal:         General: Normal range of motion.   Skin:     General: Skin is warm and dry.       Capillary Refill: Capillary refill takes less than 2 seconds.   Neurological:      General: No focal deficit present.      Mental Status: She is alert and oriented to person, place, and time.         Procedures           ED Course                                                       Medical Decision Making  SEPTIC SHOCK FOCUSED EXAM ATTESTATION    I attest that I have reassessed tissue perfusion after the fluid bolus given.    Minor Colin MD  02/15/25  05:56 EST    Patient's mean arterial pressure normalized with sepsis bolus, cover with broad-spectrum antibiotics per protocol, feels much better, tachycardia resolved.    Critical care time 30 minutes    Discussed with  with hospital service, agrees with plan.    Problems Addressed:  Hypokalemia: complicated acute illness or injury  Hypomagnesemia: complicated acute illness or injury  Pneumonia of left lower lobe due to infectious organism: complicated acute illness or injury  Sepsis, due to unspecified organism, unspecified whether acute organ dysfunction present: complicated acute illness or injury    Amount and/or Complexity of Data Reviewed  Labs: ordered.     Details: Negative PCR panel  Radiology: ordered.  ECG/medicine tests: ordered and independent interpretation performed.     Details: EKG interpretation: Sinus tachycardia, rate 120, baseline wander but no STEMI seen    Risk  Prescription drug management.  Decision regarding hospitalization.        Final diagnoses:   Pneumonia of left lower lobe due to infectious organism   Sepsis, due to unspecified organism, unspecified whether acute organ dysfunction present   Hypomagnesemia   Hypokalemia       ED Disposition  ED Disposition       ED Disposition   Decision to Admit    Condition   --    Comment   Level of Care: Telemetry [5]   Admitting Physician: JO GERBER [[id number]]                 No follow-up provider specified.       Medication List      No changes were made to your  prescriptions during this visit.            Minor Colin MD  02/15/25 0717

## 2025-02-15 NOTE — PROGRESS NOTES
"Pharmacy Antimicrobial Dosing Service    Subjective:  Kamini rGaham is a 54 y.o.female admitted with PNA. Pharmacy has been consulted to dose Vancomycin and Cefepime for possible PNA.    PMH: COPD, HTN, COVID19 in December 2024      Assessment/Plan    1. Day #1 Vancomycin: Goal -600 mcg*h/mL. Vancomycin 1250mg IV x 1 dose in ER this AM (20.2 mg/kg ABW). Will schedule vancomycin 1000mg IV q24 hours (16.1 mg/kg ABW). Will obtain trough 2/17 at 0500. MRSA ordered by provider, pending.     2. Day #1 Cefepime: 2g IV q12h for estCrCl 30-59 mL/min.    Will continue to monitor drug levels, renal function, culture and sensitivities, and patient clinical status.       Objective:  Relevant clinical data and objective history reviewed:  154.9 cm (61\")   62 kg (136 lb 11 oz)   Ideal body weight: 47.8 kg (105 lb 6.1 oz)  Adjusted ideal body weight: 53.5 kg (117 lb 14.4 oz)  Body mass index is 25.83 kg/m².        Results from last 7 days   Lab Units 02/15/25  0413   CREATININE mg/dL 1.36*     Estimated Creatinine Clearance: 39 mL/min (A) (by C-G formula based on SCr of 1.36 mg/dL (H)).  No intake/output data recorded.    Results from last 7 days   Lab Units 02/15/25  0413   WBC 10*3/mm3 9.54     Temperature    02/15/25 0337   Temp: 98.8 °F (37.1 °C)     Baseline culture/source/susceptibility:  Microbiology Results (last 10 days)       Procedure Component Value - Date/Time    Gastrointestinal Panel, PCR - Stool, Per Rectum [[id number]]  (Normal) Collected: 02/15/25 0858    Lab Status: Final result Specimen: Stool from Per Rectum Updated: 02/15/25 1027     Campylobacter Not Detected     Plesiomonas shigelloides Not Detected     Salmonella Not Detected     Vibrio Not Detected     Vibrio cholerae Not Detected     Yersinia enterocolitica Not Detected     Enteroaggregative E. coli (EAEC) Not Detected     Enteropathogenic E. coli (EPEC) Not Detected     Enterotoxigenic E. coli (ETEC) lt/st Not Detected     Shiga-like " toxin-producing E. coli (STEC) stx1/stx2 Not Detected     Shigella/Enteroinvasive E. coli (EIEC) Not Detected     Cryptosporidium Not Detected     Cyclospora cayetanensis Not Detected     Entamoeba histolytica Not Detected     Giardia lamblia Not Detected     Adenovirus F40/41 Not Detected     Astrovirus Not Detected     Norovirus GI/GII Not Detected     Rotavirus A Not Detected     Sapovirus (I, II, IV or V) Not Detected    Respiratory Panel PCR w/COVID-19(SARS-CoV-2) CHARLEY/LUIS A/NIKITA/PAD/COR/ZEV In-House, NP Swab in UTM/VTM, 2 HR TAT - Swab, Nasopharynx [[id number]]  (Normal) Collected: 02/15/25 0419    Lab Status: Final result Specimen: Swab from Nasopharynx Updated: 02/15/25 0516     ADENOVIRUS, PCR Not Detected     Coronavirus 229E Not Detected     Coronavirus HKU1 Not Detected     Coronavirus NL63 Not Detected     Coronavirus OC43 Not Detected     COVID19 Not Detected     Human Metapneumovirus Not Detected     Human Rhinovirus/Enterovirus Not Detected     Influenza A PCR Not Detected     Influenza B PCR Not Detected     Parainfluenza Virus 1 Not Detected     Parainfluenza Virus 2 Not Detected     Parainfluenza Virus 3 Not Detected     Parainfluenza Virus 4 Not Detected     RSV, PCR Not Detected     Bordetella pertussis pcr Not Detected     Bordetella parapertussis PCR Not Detected     Chlamydophila pneumoniae PCR Not Detected     Mycoplasma pneumo by PCR Not Detected    Narrative:      In the setting of a positive respiratory panel with a viral infection PLUS a negative procalcitonin without other underlying concern for bacterial infection, consider observing off antibiotics or discontinuation of antibiotics and continue supportive care. If the respiratory panel is positive for atypical bacterial infection (Bordetella pertussis, Chlamydophila pneumoniae, or Mycoplasma pneumoniae), consider antibiotic de-escalation to target atypical bacterial infection.            Yesica Norris [facility]  02/15/25 14:15 EST

## 2025-02-15 NOTE — H&P
[facility] Medicine Services  History & Physical    Patient Name: Kamini Graham  : 1970  MRN: [id number]  Primary Care Physician:  Zari Church PA  Date of admission: 2/15/2025  Date and Time of Service: 2/15/2025 at 0816    Subjective      Chief Complaint: shortness of breath    History of Present Illness: Kamini Graham is a 54 y.o. female with a CMH of COPD, HTN, HLD, history of CVA, GERD, tobacco use who presented to [facility] on 2/15/2025 with shortness of breath x 2 days.  She also endorses chest tightness, productive cough with yellow to green sputum as well as fever and chills.  Patient says still admits to smoking but she reports being down from 3 packs a day to 1 pack a day.  She report having COVID a few months ago.  She denies being on supplemental oxygen at baseline.  She also reports nausea vomiting diarrhea for the past 2 days as well.  Denies abdominal pain.    In the ED, patient was noted to be tachycardic with heart rate in the 140s.  EKG was done which revealed sinus tachycardia, rate of 130.  Patient was also noted to be hypoxic, requiring 2 L NC.  ABG was done which showed pH of 7.43, pCO2 33 and pO2 of 70.4.  CMP was remarkable for K 3.2, creatinine 1.36, BUN 18, magnesium 0.9, anion gap 19.7.  CBC was relatively unremarkable with no leukocytosis-WBC 9.5.  Lactic acid was 1.2.  CRP was noted to be elevated at 52.8.  UA not consistent with infection.  Respiratory panel was negative.  CXR revealed left lower lobe pneumonia.  Patient was given sepsis fluids, vancomycin and cefepime as well as magnesium repletion.      Review of Systems   Constitutional:  Negative for chills and fever.   Respiratory:  Positive for cough, chest tightness and shortness of breath.    Cardiovascular:  Negative for chest pain and palpitations.   Gastrointestinal:  Positive for diarrhea, nausea and vomiting. Negative for abdominal pain.   All other systems reviewed and are  negative.      Personal History     Past Medical History:   Diagnosis Date    COPD (chronic obstructive pulmonary disease)     Hyperlipidemia     Hypertension     Stroke        Past Surgical History:   Procedure Laterality Date    HYSTERECTOMY         Family History: Family history is unknown by patient. Otherwise pertinent FHx was reviewed and not pertinent to current issue.    Social History:  reports that she has been smoking cigarettes. She has never used smokeless tobacco. She reports current alcohol use of about 3.0 standard drinks of alcohol per week. She reports that she does not use drugs.    Home Medications:  Prior to Admission Medications       Prescriptions Last Dose Informant Patient Reported? Taking?    aspirin 81 MG EC tablet   Yes No    Take 1 tablet by mouth Daily.    atorvastatin (LIPITOR) 80 MG tablet   Yes No    Take 1 tablet by mouth Every Night.    buPROPion XL (WELLBUTRIN XL) 150 MG 24 hr tablet   Yes No    cloNIDine (CATAPRES) 0.2 MG tablet   Yes No    fenofibrate 160 MG tablet   Yes No    HYDROcodone-acetaminophen (NORCO)  MG per tablet   Yes No    Take 1 tablet by mouth 5 (Five) Times a Day As Needed for Moderate Pain.    losartan-hydrochlorothiazide (HYZAAR) 50-12.5 MG per tablet   Yes No    Take 1 tablet by mouth Daily.    lubiprostone (AMITIZA) 8 MCG capsule   Yes No    metoprolol succinate XL (Toprol XL) 25 MG 24 hr tablet   No No    Take 1 tablet by mouth Daily for 30 days.    montelukast (SINGULAIR) 10 MG tablet   Yes No    Omega-3 Fatty Acids (fish oil) 1000 MG capsule capsule   Yes No    Take  by mouth Daily With Breakfast.    pantoprazole (PROTONIX) 40 MG EC tablet   Yes No    Take 1 tablet by mouth Daily.    potassium chloride 10 MEQ CR tablet   Yes No    theophylline (THEODUR) 300 MG 12 hr tablet   Yes No    tiotropium (Spiriva HandiHaler) 18 MCG per inhalation capsule   No No    Place 1 capsule into inhaler and inhale Daily for 30 days.              Allergies:  Allergies    Allergen Reactions    Iodinated Contrast Media Anaphylaxis    Methylprednisolone Palpitations    Prednisone Palpitations       Objective      Vitals:   Temp:  [98.8 °F (37.1 °C)] 98.8 °F (37.1 °C)  Heart Rate:  [] 85  Resp:  [22-27] 27  BP: ()/(42-96) 112/70  Body mass index is 25.83 kg/m².    Physical Exam  General: 53 yo WF, Alert and oriented, well nourished, no acute distress.  HENT: Normocephalic, normal hearing, moist oral mucosa, no scleral icterus.  Neck: Supple, nontender, no carotid bruits, no JVD, no LAD.  Lungs: Clear to auscultation, nonlabored respiration.  Heart: RRR, no murmur, gallop or edema.  Abdomen: Soft, nontender, nondistended, + bowel sounds.  Musculoskeletal: Normal range of motion and strength, no tenderness or swelling.  Skin: Skin is warm, dry and pink, no rashes or lesions.  Psychiatric: Cooperative, appropriate mood and affect.      Diagnostic Data:  Lab Results (last 24 hours)       Procedure Component Value Units Date/Time    High Sensitivity Troponin T 1Hr [[id number]] Updated: 02/15/25 0613    Specimen: Blood     POC Lactate [[id number]]  (Normal) Collected: 02/15/25 0557    Specimen: Blood Updated: 02/15/25 0559     Lactate 1.2 mmol/L      Comment: Serial Number: [id number]:  [id number]       Blood Gas, Arterial - [[id number]]  (Abnormal) Collected: 02/15/25 0534    Specimen: Arterial Blood Updated: 02/15/25 0538     Site Right Radial     Tang's Test Positive     pH, Arterial 7.438 pH units      pCO2, Arterial 33.0 mm Hg      pO2, Arterial 70.4 mm Hg      HCO3, Arterial 22.3 mmol/L      Base Excess, Arterial -1.3 mmol/L      Comment: Serial Number: [id number]:  [id number]        O2 Saturation, Arterial 94.7 %      CO2 Content 23.3 mmol/L      Barometric Pressure for Blood Gas --     Comment: N/A        Modality Cannula     FIO2 28 %      Hemodilution No     PO2/FIO2 251    Theophylline Level [[id number]]  (Abnormal) Collected: 02/15/25 0413    Specimen: Blood from  Arm, Left Updated: 02/15/25 0536     Theophylline Level 1.0 mcg/mL     Respiratory Panel PCR w/COVID-19(SARS-CoV-2) CHARLEY/LUIS A/NIKITA/PAD/COR/ZEV In-House, NP Swab in UTM/VTM, 2 HR TAT - Swab, Nasopharynx [[id number]]  (Normal) Collected: 02/15/25 0419    Specimen: Swab from Nasopharynx Updated: 02/15/25 0516     ADENOVIRUS, PCR Not Detected     Coronavirus 229E Not Detected     Coronavirus HKU1 Not Detected     Coronavirus NL63 Not Detected     Coronavirus OC43 Not Detected     COVID19 Not Detected     Human Metapneumovirus Not Detected     Human Rhinovirus/Enterovirus Not Detected     Influenza A PCR Not Detected     Influenza B PCR Not Detected     Parainfluenza Virus 1 Not Detected     Parainfluenza Virus 2 Not Detected     Parainfluenza Virus 3 Not Detected     Parainfluenza Virus 4 Not Detected     RSV, PCR Not Detected     Bordetella pertussis pcr Not Detected     Bordetella parapertussis PCR Not Detected     Chlamydophila pneumoniae PCR Not Detected     Mycoplasma pneumo by PCR Not Detected    Narrative:      In the setting of a positive respiratory panel with a viral infection PLUS a negative procalcitonin without other underlying concern for bacterial infection, consider observing off antibiotics or discontinuation of antibiotics and continue supportive care. If the respiratory panel is positive for atypical bacterial infection (Bordetella pertussis, Chlamydophila pneumoniae, or Mycoplasma pneumoniae), consider antibiotic de-escalation to target atypical bacterial infection.    CBC & Differential [[id number]]  (Abnormal) Collected: 02/15/25 0413    Specimen: Blood Updated: 02/15/25 0514    Narrative:      The following orders were created for panel order CBC & Differential.  Procedure                               Abnormality         Status                     ---------                               -----------         ------                     CBC Auto Differential[[id number]]        Abnormal            Final  result               Scan Slide[[id number]]                                       Final result                 Please view results for these tests on the individual orders.    CBC Auto Differential [[id number]]  (Abnormal) Collected: 02/15/25 0413    Specimen: Blood Updated: 02/15/25 0514     WBC 9.54 10*3/mm3      RBC 3.76 10*6/mm3      Hemoglobin 14.2 g/dL      Hematocrit 41.2 %      .6 fL      MCH 37.8 pg      MCHC 34.5 g/dL      RDW 13.2 %      RDW-SD 53.3 fl      MPV 10.5 fL      Platelets 208 10*3/mm3     Narrative:      The previously reported component NRBC is no longer being reported. Previous result was 0.0 /100 WBC (Reference Range: 0.0-0.2 /100 WBC) on 2/15/2025 at 0446 EST.    Scan Slide [[id number]] Collected: 02/15/25 0413    Specimen: Blood Updated: 02/15/25 0514     Scan Slide --     Comment: See Manual Differential Results       Manual Differential [[id number]]  (Abnormal) Collected: 02/15/25 0413    Specimen: Blood Updated: 02/15/25 0514     Neutrophil % 66.0 %      Lymphocyte % 15.0 %      Monocyte % 2.0 %      Basophil % 1.0 %      Bands %  16.0 %      Neutrophils Absolute 7.82 10*3/mm3      Lymphocytes Absolute 1.43 10*3/mm3      Monocytes Absolute 0.19 10*3/mm3      Basophils Absolute 0.10 10*3/mm3      RBC Morphology Normal     Vacuolated Neutrophils Slight/1+     Platelet Morphology Normal    C-reactive Protein [[id number]]  (Abnormal) Collected: 02/15/25 0413    Specimen: Blood from Arm, Left Updated: 02/15/25 0505     C-Reactive Protein 52.80 mg/dL     Magnesium [[id number]]  (Abnormal) Collected: 02/15/25 0413    Specimen: Blood from Arm, Left Updated: 02/15/25 0450     Magnesium 0.9 mg/dL     Comprehensive Metabolic Panel [[id number]]  (Abnormal) Collected: 02/15/25 0413    Specimen: Blood from Arm, Left Updated: 02/15/25 0447     Glucose 116 mg/dL      BUN 18 mg/dL      Creatinine 1.36 mg/dL      Sodium 132 mmol/L      Potassium 3.2 mmol/L      Chloride 93 mmol/L      CO2 19.3 mmol/L       Calcium 8.1 mg/dL      Total Protein 7.1 g/dL      Albumin 3.9 g/dL      ALT (SGPT) 28 U/L      AST (SGOT) 30 U/L      Alkaline Phosphatase 80 U/L      Total Bilirubin 1.0 mg/dL      Globulin 3.2 gm/dL      A/G Ratio 1.2 g/dL      BUN/Creatinine Ratio 13.2     Anion Gap 19.7 mmol/L      eGFR 45.8 mL/min/1.73     Narrative:      GFR Categories in Chronic Kidney Disease (CKD)      GFR Category          GFR (mL/min/1.73)    Interpretation  G1                     90 or greater         Normal or high (1)  G2                      60-89                Mild decrease (1)  G3a                   45-59                Mild to moderate decrease  G3b                   30-44                Moderate to severe decrease  G4                    15-29                Severe decrease  G5                    14 or less           Kidney failure          (1)In the absence of evidence of kidney disease, neither GFR category G1 or G2 fulfill the criteria for CKD.    eGFR calculation 2021 CKD-EPI creatinine equation, which does not include race as a factor    Phosphorus [[id number]]  (Normal) Collected: 02/15/25 0413    Specimen: Blood from Arm, Left Updated: 02/15/25 0447     Phosphorus 3.1 mg/dL     High Sensitivity Troponin T [[id number]]  (Abnormal) Collected: 02/15/25 0413    Specimen: Blood from Arm, Left Updated: 02/15/25 0447     HS Troponin T 14 ng/L     Narrative:      High Sensitive Troponin T Reference Range:  <14.0 ng/L- Negative Female for AMI  <22.0 ng/L- Negative Male for AMI  >=14 - Abnormal Female indicating possible myocardial injury.  >=22 - Abnormal Male indicating possible myocardial injury.   Clinicians would have to utilize clinical acumen, EKG, Troponin, and serial changes to determine if it is an Acute Myocardial Infarction or myocardial injury due to an underlying chronic condition.         [place] Draw [[id number]] Collected: 02/15/25 0413    Specimen: Blood Updated: 02/15/25 0430    Narrative:      The following  orders were created for panel order [place] Draw.  Procedure                               Abnormality         Status                     ---------                               -----------         ------                     Green Top (Gel)[[id number]]                                  Final result               Lavender Top[[id number]]                                     Final result               Gold Top - SST[[id number]]                                   Final result               Light Blue Top[[id number]]                                   Final result                 Please view results for these tests on the individual orders.    Green Top (Gel) [[id number]] Collected: 02/15/25 0413    Specimen: Blood from Arm, Left Updated: 02/15/25 0430     Extra Tube Hold for add-ons.     Comment: Auto resulted.       Lavender Top [[id number]] Collected: 02/15/25 0413    Specimen: Blood Updated: 02/15/25 0430     Extra Tube hold for add-on     Comment: Auto resulted       Gold Top - SST [[id number]] Collected: 02/15/25 0413    Specimen: Blood from Arm, Left Updated: 02/15/25 0430     Extra Tube Hold for add-ons.     Comment: Auto resulted.       Light Blue Top [[id number]] Collected: 02/15/25 0413    Specimen: Blood from Arm, Left Updated: 02/15/25 0430     Extra Tube Hold for add-ons.     Comment: Auto resulted       Calcium, Ionized [[id number]]  (Abnormal) Collected: 02/15/25 0413    Specimen: Blood Updated: 02/15/25 0428     Ionized Calcium 0.91 mmol/L     Blood Culture - Blood, Arm, Left [[id number]] Collected: 02/15/25 0413    Specimen: Blood from Arm, Left Updated: 02/15/25 0422    Blood Culture - Blood, Arm, Left [[id number]] Collected: 02/15/25 0413    Specimen: Blood from Arm, Left Updated: 02/15/25 0422             Imaging Results (Last 24 Hours)       Procedure Component Value Units Date/Time    XR Chest 1 View [[id number]] Collected: 02/15/25 0422     Updated: 02/15/25 0427    Narrative:      XR CHEST 1 VW    Date of Exam:  2/15/2025 4:02 AM EST    Indication: Severe Sepsis triage protocol    Comparison: Chest radiograph 12/28/2024    Findings:  The heart size and pulmonary vascular markings are normal. The right lung is clear. There is faint left lower lobe airspace disease suggestive of pneumonia. There is no pneumothorax. The osseous structures are normal.      Impression:      Impression:  Left lower lobe pneumonia.          Electronically Signed: Riky Cheney MD    2/15/2025 4:25 AM EST    Workstation ID: [id number]              Assessment & Plan        This is a 54 y.o. female with:    Active and Resolved Problems  Active Hospital Problems    Diagnosis  POA    **Pneumonia [J18.9]  Yes      Resolved Hospital Problems   No resolved problems to display.     Sepsis  LLL pneumonia  Acute respiratory failure with hypoxia  On 2 L NC, baseline room air  CXR with left lower lobe pneumonia  WBC 9.5, CRP 52.8 PCT pending  Respiratory panel negative  Received sepsis fluids in ED  Continue vancomycin and cefepime, pharmacy to dose  Sputum cultures, MRSA screen pending  Duoneb q4h  May need pulmonology consult if oxygen needs increased    Acute kidney injury  Hypokalemia  Hypomagnesemia  Nausea, vomiting, diarrhea  Creatinine 1.36, BUN 18, baseline creatinine 0.6  Magnesium 0.9, potassium 3.2  GI panel negative, continue supportive care  IV fluids  Avoid nephrotoxic medications  Continue to monitor BMP, may need nephrology consult if trends upwards  Continue to replete electrolytes per protocol      Hypertension-BP controlled, holding HCTZ and losartan in setting of NORRIS, continue metoprolol  Hyperlipidemia-continue ASA, statin  Chronic pain syndrome-continue home pain medication  GERD-continue PPI        VTE Prophylaxis:  Pharmacologic VTE prophylaxis orders are present.        The patient desires to be as follows:    CODE STATUS:    Code Status (Patient has no pulse and is not breathing): CPR (Attempt to Resuscitate)  Medical Interventions  (Patient has pulse or is breathing): Full Support        Akshat Graham, who can be contacted at [phone], is the designated person to make medical decisions on the patient's behalf if She is incapable of doing so. This was clarified with patient and/or next of kin on 2/15/2025 during the course of this H&P.    Admission Status:  I believe this patient meets inpatient status.    Expected Length of Stay: 3 to 4 days    PDMP and Medication Dispenses via Sidebar reviewed and consistent with patient reported medications.    I discussed the patient's findings and my recommendations with patient.      Signature:     This document has been electronically signed by Judith Dow PA-C on February 15, 2025 08:17 [facility] Hospitalist Team

## 2025-02-16 VITALS
RESPIRATION RATE: 16 BRPM | HEART RATE: 87 BPM | HEIGHT: 62 IN | OXYGEN SATURATION: 100 % | DIASTOLIC BLOOD PRESSURE: 67 MMHG | WEIGHT: 142.86 LBS | BODY MASS INDEX: 26.29 KG/M2 | SYSTOLIC BLOOD PRESSURE: 116 MMHG | TEMPERATURE: 98.3 F

## 2025-02-16 LAB
ANION GAP SERPL CALCULATED.3IONS-SCNC: 12.4 MMOL/L (ref 5–15)
BUN SERPL-MCNC: 12 MG/DL (ref 6–20)
BUN/CREAT SERPL: 16.4 (ref 7–25)
CALCIUM SPEC-SCNC: 8.3 MG/DL (ref 8.6–10.5)
CHLORIDE SERPL-SCNC: 100 MMOL/L (ref 98–107)
CO2 SERPL-SCNC: 18.6 MMOL/L (ref 22–29)
CORTIS SERPL-MCNC: 21.1 MCG/DL
CREAT SERPL-MCNC: 0.73 MG/DL (ref 0.57–1)
EGFRCR SERPLBLD CKD-EPI 2021: 96.7 ML/MIN/1.73
GLUCOSE SERPL-MCNC: 102 MG/DL (ref 65–99)
POTASSIUM SERPL-SCNC: 3.4 MMOL/L (ref 3.5–5.2)
QT INTERVAL: 335 MS
QTC INTERVAL: 474 MS
SODIUM SERPL-SCNC: 131 MMOL/L (ref 136–145)
TSH SERPL DL<=0.05 MIU/L-ACNC: 2.07 UIU/ML (ref 0.27–4.2)

## 2025-02-16 PROCEDURE — 94761 N-INVAS EAR/PLS OXIMETRY MLT: CPT

## 2025-02-16 PROCEDURE — 80048 BASIC METABOLIC PNL TOTAL CA: CPT | Performed by: INTERNAL MEDICINE

## 2025-02-16 PROCEDURE — 25810000003 SODIUM CHLORIDE 0.9 % SOLUTION: Performed by: INTERNAL MEDICINE

## 2025-02-16 PROCEDURE — 25010000002 HEPARIN (PORCINE) PER 1000 UNITS

## 2025-02-16 PROCEDURE — 94799 UNLISTED PULMONARY SVC/PX: CPT

## 2025-02-16 PROCEDURE — 25810000003 SODIUM CHLORIDE 0.9 % SOLUTION 250 ML FLEX CONT

## 2025-02-16 PROCEDURE — 25010000002 CEFEPIME PER 500 MG

## 2025-02-16 PROCEDURE — 84443 ASSAY THYROID STIM HORMONE: CPT | Performed by: INTERNAL MEDICINE

## 2025-02-16 PROCEDURE — 94664 DEMO&/EVAL PT USE INHALER: CPT

## 2025-02-16 PROCEDURE — 96376 TX/PRO/DX INJ SAME DRUG ADON: CPT

## 2025-02-16 PROCEDURE — 96372 THER/PROPH/DIAG INJ SC/IM: CPT

## 2025-02-16 PROCEDURE — 82533 TOTAL CORTISOL: CPT | Performed by: INTERNAL MEDICINE

## 2025-02-16 PROCEDURE — 25010000002 VANCOMYCIN 1 G RECONSTITUTED SOLUTION 1 EACH VIAL

## 2025-02-16 RX ORDER — CALCIUM CARBONATE 500 MG/1
2 TABLET, CHEWABLE ORAL 3 TIMES DAILY
Status: DISCONTINUED | OUTPATIENT
Start: 2025-02-16 | End: 2025-02-16 | Stop reason: HOSPADM

## 2025-02-16 RX ORDER — LOPERAMIDE HYDROCHLORIDE 2 MG/1
2 CAPSULE ORAL 4 TIMES DAILY PRN
Status: DISCONTINUED | OUTPATIENT
Start: 2025-02-16 | End: 2025-02-16 | Stop reason: HOSPADM

## 2025-02-16 RX ORDER — SODIUM CHLORIDE 9 MG/ML
75 INJECTION, SOLUTION INTRAVENOUS CONTINUOUS
Status: DISCONTINUED | OUTPATIENT
Start: 2025-02-16 | End: 2025-02-16 | Stop reason: HOSPADM

## 2025-02-16 RX ADMIN — GUAIFENESIN 1200 MG: 600 TABLET, MULTILAYER, EXTENDED RELEASE ORAL at 08:37

## 2025-02-16 RX ADMIN — PANTOPRAZOLE SODIUM 40 MG: 40 TABLET, DELAYED RELEASE ORAL at 08:37

## 2025-02-16 RX ADMIN — METOPROLOL SUCCINATE 25 MG: 25 TABLET, EXTENDED RELEASE ORAL at 08:37

## 2025-02-16 RX ADMIN — Medication 10 ML: at 08:38

## 2025-02-16 RX ADMIN — VANCOMYCIN HYDROCHLORIDE 1000 MG: 1 INJECTION, POWDER, LYOPHILIZED, FOR SOLUTION INTRAVENOUS at 05:27

## 2025-02-16 RX ADMIN — ASPIRIN 81 MG: 81 TABLET, COATED ORAL at 08:37

## 2025-02-16 RX ADMIN — IPRATROPIUM BROMIDE AND ALBUTEROL SULFATE 3 ML: .5; 3 SOLUTION RESPIRATORY (INHALATION) at 11:54

## 2025-02-16 RX ADMIN — SODIUM CHLORIDE 75 ML/HR: 9 INJECTION, SOLUTION INTRAVENOUS at 11:24

## 2025-02-16 RX ADMIN — SODIUM BICARBONATE 25 MEQ: 84 INJECTION INTRAVENOUS at 11:23

## 2025-02-16 RX ADMIN — CEFEPIME 2000 MG: 2 INJECTION, POWDER, FOR SOLUTION INTRAVENOUS at 04:30

## 2025-02-16 RX ADMIN — IPRATROPIUM BROMIDE AND ALBUTEROL SULFATE 3 ML: .5; 3 SOLUTION RESPIRATORY (INHALATION) at 07:00

## 2025-02-16 RX ADMIN — HEPARIN SODIUM 5000 UNITS: 5000 INJECTION INTRAVENOUS; SUBCUTANEOUS at 04:30

## 2025-02-16 RX ADMIN — HYDROCODONE BITARTRATE AND ACETAMINOPHEN 1 TABLET: 10; 325 TABLET ORAL at 05:28

## 2025-02-16 RX ADMIN — HYDROCODONE BITARTRATE AND ACETAMINOPHEN 1 TABLET: 10; 325 TABLET ORAL at 12:48

## 2025-02-16 RX ADMIN — NICOTINE 1 PATCH: 14 PATCH TRANSDERMAL at 11:30

## 2025-02-16 RX ADMIN — IPRATROPIUM BROMIDE AND ALBUTEROL SULFATE 3 ML: .5; 3 SOLUTION RESPIRATORY (INHALATION) at 14:49

## 2025-02-16 RX ADMIN — HEPARIN SODIUM 5000 UNITS: 5000 INJECTION INTRAVENOUS; SUBCUTANEOUS at 11:23

## 2025-02-16 RX ADMIN — ANTACID TABLETS 2 TABLET: 500 TABLET, CHEWABLE ORAL at 11:23

## 2025-02-16 NOTE — PLAN OF CARE
Goal Outcome Evaluation:  Patient's pain controlled with current pain regimen, has chronic pain in back for years, new IV obtained and fluids infusing per orders, patient up ad jerad in room, currently on room air with sats in mid 90's, agitation/anxiety with cords and lines from monitoring devices, stated it gives her increased anxiety, offered to reach out to provider and patient refused, did state that she has left AMA due to issues before

## 2025-02-16 NOTE — SIGNIFICANT NOTE
02/16/25 0007   Vital Signs   BP (!) 112/105     Patient would not stay still for BP to be obtained, increased anxiety/agitation over lines/cords

## 2025-02-16 NOTE — PROGRESS NOTES
[facility] MEDICINE SERVICE  DAILY PROGRESS NOTE    NAME: Kamini Graham  : 1970  MRN: [id number]      LOS: 1 day     PROVIDER OF SERVICE: Marlen Marie MD    Chief Complaint: Pneumonia    Subjective:     Interval History:    Patient seen and evaluated at bedside.   H&P, labs and imaging reviewed.  Patient complaining of diarrhea since Thursday.  3 bowel movements since morning.  Patient on 3 L per nasal cannula O2 sats 97%.   at the bedside.  Treatment plan discussed with patient. All questions addressed.     Review of Systems:   All 21 ROS were negative except mentioned above.    Objective:     Vital Signs  Temp:  [98.3 °F (36.8 °C)] 98.3 °F (36.8 °C)  Heart Rate:  [72-93] 72  Resp:  [13-23] 20  BP: ()/() 133/82  Flow (L/min) (Oxygen Therapy):  [2] 2   Body mass index is 26.56 kg/m².    Physical Exam   General: acute distress, appears stated age  Neuro: Awake and alert, oriented x3, no focal deficits appreciated  Head: Atraumatic, normocephalic  HEENT: EOMI, anicteric, normal sclerae and conjunctivae, moist mucus membranes  Neck: supple, no lymphadenopathy  CV: RRR, soft heart sounds, no murmurs appreciated, no peripheral edema  Pulm: Decreased breath sounds,  increased work of breathing, no adventitious sounds  Abd: Soft, nontender, nondistended  Skin: Warm, dry and intact  Psych: Appropriate mood and affect    Scheduled Meds   aspirin, 81 mg, Oral, Daily  atorvastatin, 80 mg, Oral, Nightly  cefepime, 2,000 mg, Intravenous, Q12H  guaiFENesin, 1,200 mg, Oral, Q12H  heparin (porcine), 5,000 Units, Subcutaneous, Q8H  [Held by provider] losartan, 50 mg, Oral, Q24H   And  [Held by provider] hydroCHLOROthiazide, 12.5 mg, Oral, Q24H  ipratropium-albuterol, 3 mL, Nebulization, 4x Daily - RT  metoprolol succinate XL, 25 mg, Oral, Daily  montelukast, 10 mg, Oral, Nightly  nicotine, 1 patch, Transdermal, Q24H  pantoprazole, 40 mg, Oral, BID  sodium chloride, 10 mL, Intravenous,  Q12H  vancomycin, 15 mg/kg, Intravenous, Q24H       PRN Meds     acetaminophen **OR** acetaminophen **OR** acetaminophen    albuterol    benzonatate    senna-docusate sodium **AND** polyethylene glycol **AND** bisacodyl **AND** bisacodyl    Calcium Replacement - Follow Nurse / BPA Driven Protocol    HYDROcodone-acetaminophen    Magnesium Standard Dose Replacement - Follow Nurse / BPA Driven Protocol    melatonin    ondansetron ODT **OR** ondansetron    Pharmacy to Dose Cefepime    Pharmacy to dose vancomycin    Phosphorus Replacement - Follow Nurse / BPA Driven Protocol    Potassium Replacement - Follow Nurse / BPA Driven Protocol    sodium chloride    sodium chloride    sodium chloride   Infusions  Pharmacy to Dose Cefepime,   Pharmacy to dose vancomycin,           Diagnostic Data    Results from last 7 days   Lab Units 02/15/25  2217 02/15/25  0413   WBC 10*3/mm3 6.75 9.54   HEMOGLOBIN g/dL 12.2 14.2   HEMATOCRIT % 36.6 41.2   PLATELETS 10*3/mm3 165 208   GLUCOSE mg/dL 89 116*   CREATININE mg/dL 0.91 1.36*   BUN mg/dL 20 18   SODIUM mmol/L 127* 132*   POTASSIUM mmol/L 3.8 3.2*   AST (SGOT) U/L  --  30   ALT (SGPT) U/L  --  28   ALK PHOS U/L  --  80   BILIRUBIN mg/dL  --  1.0   ANION GAP mmol/L 13.9 19.7*       XR Chest 1 View    Result Date: 2/15/2025  Impression: Left lower lobe pneumonia. Electronically Signed: Riky Cheney MD  2/15/2025 4:25 AM EST  Workstation ID: [id number]     Interval results reviewed.    Assessment/Plan:   Acute hypoxic respiratory failure  Left lower lobe pneumonia unspecified  Hyponatremia  Hypocalcemia  Diarrhea    Continue oxygen 3 L per nasal cannula, monitor sats  MRSA DNA PCR negative will DC vancomycin and continue cefepime  Blood cultures no growth sputum cultures pending, end date CRP and procalcitonin    Hyponatremia could be due to diarrhea versus SIADH caused by pneumonia  Will check urine sodium, TSH, cortisol  Sodium bicarb 25 mg IV  Repeat BMP    Start on calcium carbonate 2  tablets 3 times daily for hypocalcemia    Give Imodium for diarrhea        Treatment plan discussed with RN.     VTE Prophylaxis:  Pharmacologic VTE prophylaxis orders are present.         Code status is   Code Status and Medical Interventions: CPR (Attempt to Resuscitate); Full Support   Ordered at: 02/15/25 0816     Code Status (Patient has no pulse and is not breathing):    CPR (Attempt to Resuscitate)     Medical Interventions (Patient has pulse or is breathing):    Full Support       Plan for disposition:     Barriers to Discharge: On oxygen, IV antibiotics  Anticipated Date of Discharge: 2/18/2025  Place of Discharge: Home      Time: 40 minutes     Signature: Electronically signed by Marlen Marie MD, 02/16/25, 09:41 EST.  [facility] Hospitalist Team

## 2025-02-16 NOTE — PLAN OF CARE
Goal Outcome Evaluation:         Pt is able to make needs known. Pain is managed with medication regimen. Pt is able to ambulate independently. Pt does not want IV in hand, I offered to replace IV in different location and still declined. Education is complete, call light is in reach, and no other needs at this time.Continue to monitor.

## 2025-02-16 NOTE — PROGRESS NOTES
Kamini Graham is a 54 y.o. female admitted with pneumonia.     Recent Labs     02/15/25  0413 02/15/25  2217   CREATININE 1.36* 0.91   BUN 18 20   * 127*   K 3.2* 3.8   CL 93* 96*   CO2 19.3* 17.1*     Estimated Creatinine Clearance: 60.3 mL/min (by C-G formula based on SCr of 0.91 mg/dL).    Assessment/Plan  Cefepime renally adjusted to 2 gm q8h per the Adult Renal Dosing of Medication policy for estCrCl > 60 mL/min    Carrie Floyd, [facility]  2/16/2025

## 2025-02-17 NOTE — PAYOR COMM NOTE
"  Inpatient order 2/15@0813 - d/c AMA on 2/16@1937     ER admit.  MD notes and clinical attached.   =======  AUTHORIZATION PENDING:   PLEASE FAX OR CALL DETERMINATION TO CONTACT BELOW:       THANK YOU,    CHRISTINA ChamorroN, RN  Utilization Review  [facility]  Phone: [phone]  Fax: [phone]      NPI: [id number]  Tax ID: [id number]        Kamini Graham (54 y.o. Female)       Date of Birth   1970    Social Security Number       Address   [address] [place] [address] [place] [address]    Home Phone   [phone]    MRN   [id number]       Voodoo   None    Marital Status                               Admission Date   2/15/25    Admission Type   Emergency    Admitting Provider   Milo Alvarez MD    Attending Provider       Department, Room/Bed   [facility] SURGICAL INPATIENT, 4121/1       Discharge Date   2/16/2025    Discharge Disposition   Left Against Medical Advice    Discharge Destination   Home                              Attending Provider: (none)   Allergies: Iodinated Contrast Media, Methylprednisolone, Prednisone    Isolation: None   Infection: COVID (confirmed) (12/28/24), C.difficile (rule out) (02/16/25)   Code Status: Prior    Ht: 156.2 cm (61.5\")   Wt: 64.8 kg (142 lb 13.7 oz)    Admission Cmt: None   Principal Problem: Pneumonia [J18.9]                   Active Insurance as of 2/15/2025       Primary Coverage       Payor Plan Insurance Group Employer/Plan Group    [employer] [id number]       Payor Plan Address Payor Plan Phone Number Payor Plan Fax Number Effective Dates    MAIL STOP:   1/15/2024 - None Entered    [address] [address]       [facility] [id number]         Subscriber Name Subscriber Birth Date Member ID       KAMINI GRAHAM 1970 [id number]                     Emergency Contacts        (Rel.) Home Phone Work Phone Mobile Phone    SUSIEABDIAZIZ DELANEY (Spouse) [phone] -- [phone] "                 History & Physical        Judith Dow PA-C at 02/15/25 0816       Attestation signed by Milo Alvarez MD at 02/15/25 1336    I have reviewed this documentation and agree.                      [facility]  History & Physical    Patient Name: Kamini Graham  : 1970  MRN: [id number]  Primary Care Physician:  Zari Church PA  Date of admission: 2/15/2025  Date and Time of Service: 2/15/2025 at 0816    Subjective      Chief Complaint: shortness of breath    History of Present Illness: Kamini Graham is a 54 y.o. female with a CMH of COPD, HTN, HLD, history of CVA, GERD, tobacco use who presented to [facility] on 2/15/2025 with shortness of breath x 2 days.  She also endorses chest tightness, productive cough with yellow to green sputum as well as fever and chills.  Patient says still admits to smoking but she reports being down from 3 packs a day to 1 pack a day.  She report having COVID a few months ago.  She denies being on supplemental oxygen at baseline.  She also reports nausea vomiting diarrhea for the past 2 days as well.  Denies abdominal pain.    In the ED, patient was noted to be tachycardic with heart rate in the 140s.  EKG was done which revealed sinus tachycardia, rate of 130.  Patient was also noted to be hypoxic, requiring 2 L NC.  ABG was done which showed pH of 7.43, pCO2 33 and pO2 of 70.4.  CMP was remarkable for K 3.2, creatinine 1.36, BUN 18, magnesium 0.9, anion gap 19.7.  CBC was relatively unremarkable with no leukocytosis-WBC 9.5.  Lactic acid was 1.2.  CRP was noted to be elevated at 52.8.  UA not consistent with infection.  Respiratory panel was negative.  CXR revealed left lower lobe pneumonia.  Patient was given sepsis fluids, vancomycin and cefepime as well as magnesium repletion.      Review of Systems   Constitutional:  Negative for chills and fever.   Respiratory:  Positive for cough, chest tightness and  shortness of breath.    Cardiovascular:  Negative for chest pain and palpitations.   Gastrointestinal:  Positive for diarrhea, nausea and vomiting. Negative for abdominal pain.   All other systems reviewed and are negative.      Personal History     Past Medical History:   Diagnosis Date    COPD (chronic obstructive pulmonary disease)     Hyperlipidemia     Hypertension     Stroke        Past Surgical History:   Procedure Laterality Date    HYSTERECTOMY         Family History: Family history is unknown by patient. Otherwise pertinent FHx was reviewed and not pertinent to current issue.    Social History:  reports that she has been smoking cigarettes. She has never used smokeless tobacco. She reports current alcohol use of about 3.0 standard drinks of alcohol per week. She reports that she does not use drugs.    Home Medications:  Prior to Admission Medications       Prescriptions Last Dose Informant Patient Reported? Taking?    aspirin 81 MG EC tablet   Yes No    Take 1 tablet by mouth Daily.    atorvastatin (LIPITOR) 80 MG tablet   Yes No    Take 1 tablet by mouth Every Night.    buPROPion XL (WELLBUTRIN XL) 150 MG 24 hr tablet   Yes No    cloNIDine (CATAPRES) 0.2 MG tablet   Yes No    fenofibrate 160 MG tablet   Yes No    HYDROcodone-acetaminophen (NORCO)  MG per tablet   Yes No    Take 1 tablet by mouth 5 (Five) Times a Day As Needed for Moderate Pain.    losartan-hydrochlorothiazide (HYZAAR) 50-12.5 MG per tablet   Yes No    Take 1 tablet by mouth Daily.    lubiprostone (AMITIZA) 8 MCG capsule   Yes No    metoprolol succinate XL (Toprol XL) 25 MG 24 hr tablet   No No    Take 1 tablet by mouth Daily for 30 days.    montelukast (SINGULAIR) 10 MG tablet   Yes No    Omega-3 Fatty Acids (fish oil) 1000 MG capsule capsule   Yes No    Take  by mouth Daily With Breakfast.    pantoprazole (PROTONIX) 40 MG EC tablet   Yes No    Take 1 tablet by mouth Daily.    potassium chloride 10 MEQ CR tablet   Yes No     theophylline (THEODUR) 300 MG 12 hr tablet   Yes No    tiotropium (Spiriva HandiHaler) 18 MCG per inhalation capsule   No No    Place 1 capsule into inhaler and inhale Daily for 30 days.              Allergies:  Allergies   Allergen Reactions    Iodinated Contrast Media Anaphylaxis    Methylprednisolone Palpitations    Prednisone Palpitations       Objective      Vitals:   Temp:  [98.8 °F (37.1 °C)] 98.8 °F (37.1 °C)  Heart Rate:  [] 85  Resp:  [22-27] 27  BP: ()/(42-96) 112/70  Body mass index is 25.83 kg/m².    Physical Exam  General: 55 yo WF, Alert and oriented, well nourished, no acute distress.  HENT: Normocephalic, normal hearing, moist oral mucosa, no scleral icterus.  Neck: Supple, nontender, no carotid bruits, no JVD, no LAD.  Lungs: Clear to auscultation, nonlabored respiration.  Heart: RRR, no murmur, gallop or edema.  Abdomen: Soft, nontender, nondistended, + bowel sounds.  Musculoskeletal: Normal range of motion and strength, no tenderness or swelling.  Skin: Skin is warm, dry and pink, no rashes or lesions.  Psychiatric: Cooperative, appropriate mood and affect.      Diagnostic Data:  Lab Results (last 24 hours)       Procedure Component Value Units Date/Time    High Sensitivity Troponin T 1Hr [[id number]] Updated: 02/15/25 0613    Specimen: Blood     POC Lactate [[id number]]  (Normal) Collected: 02/15/25 0557    Specimen: Blood Updated: 02/15/25 0559     Lactate 1.2 mmol/L      Comment: Serial Number: [id number]:  [id number]       Blood Gas, Arterial - [[id number]]  (Abnormal) Collected: 02/15/25 0534    Specimen: Arterial Blood Updated: 02/15/25 0538     Site Right Radial     Tang's Test Positive     pH, Arterial 7.438 pH units      pCO2, Arterial 33.0 mm Hg      pO2, Arterial 70.4 mm Hg      HCO3, Arterial 22.3 mmol/L      Base Excess, Arterial -1.3 mmol/L      Comment: Serial Number: [id number]:  [id number]        O2 Saturation, Arterial 94.7 %      CO2 Content 23.3 mmol/L       Barometric Pressure for Blood Gas --     Comment: N/A        Modality Cannula     FIO2 28 %      Hemodilution No     PO2/FIO2 251    Theophylline Level [[id number]]  (Abnormal) Collected: 02/15/25 0413    Specimen: Blood from Arm, Left Updated: 02/15/25 0536     Theophylline Level 1.0 mcg/mL     Respiratory Panel PCR w/COVID-19(SARS-CoV-2) CHARLEY/LUIS A/NIKITA/PAD/COR/ZEV In-House, NP Swab in UTM/VTM, 2 HR TAT - Swab, Nasopharynx [[id number]]  (Normal) Collected: 02/15/25 0419    Specimen: Swab from Nasopharynx Updated: 02/15/25 0516     ADENOVIRUS, PCR Not Detected     Coronavirus 229E Not Detected     Coronavirus HKU1 Not Detected     Coronavirus NL63 Not Detected     Coronavirus OC43 Not Detected     COVID19 Not Detected     Human Metapneumovirus Not Detected     Human Rhinovirus/Enterovirus Not Detected     Influenza A PCR Not Detected     Influenza B PCR Not Detected     Parainfluenza Virus 1 Not Detected     Parainfluenza Virus 2 Not Detected     Parainfluenza Virus 3 Not Detected     Parainfluenza Virus 4 Not Detected     RSV, PCR Not Detected     Bordetella pertussis pcr Not Detected     Bordetella parapertussis PCR Not Detected     Chlamydophila pneumoniae PCR Not Detected     Mycoplasma pneumo by PCR Not Detected    Narrative:      In the setting of a positive respiratory panel with a viral infection PLUS a negative procalcitonin without other underlying concern for bacterial infection, consider observing off antibiotics or discontinuation of antibiotics and continue supportive care. If the respiratory panel is positive for atypical bacterial infection (Bordetella pertussis, Chlamydophila pneumoniae, or Mycoplasma pneumoniae), consider antibiotic de-escalation to target atypical bacterial infection.    CBC & Differential [[id number]]  (Abnormal) Collected: 02/15/25 0413    Specimen: Blood Updated: 02/15/25 0514    Narrative:      The following orders were created for panel order CBC & Differential.  Procedure                                Abnormality         Status                     ---------                               -----------         ------                     CBC Auto Differential[[id number]]        Abnormal            Final result               Scan Slide[[id number]]                                       Final result                 Please view results for these tests on the individual orders.    CBC Auto Differential [[id number]]  (Abnormal) Collected: 02/15/25 0413    Specimen: Blood Updated: 02/15/25 0514     WBC 9.54 10*3/mm3      RBC 3.76 10*6/mm3      Hemoglobin 14.2 g/dL      Hematocrit 41.2 %      .6 fL      MCH 37.8 pg      MCHC 34.5 g/dL      RDW 13.2 %      RDW-SD 53.3 fl      MPV 10.5 fL      Platelets 208 10*3/mm3     Narrative:      The previously reported component NRBC is no longer being reported. Previous result was 0.0 /100 WBC (Reference Range: 0.0-0.2 /100 WBC) on 2/15/2025 at 0446 EST.    Scan Slide [[id number]] Collected: 02/15/25 0413    Specimen: Blood Updated: 02/15/25 0514     Scan Slide --     Comment: See Manual Differential Results       Manual Differential [[id number]]  (Abnormal) Collected: 02/15/25 0413    Specimen: Blood Updated: 02/15/25 0514     Neutrophil % 66.0 %      Lymphocyte % 15.0 %      Monocyte % 2.0 %      Basophil % 1.0 %      Bands %  16.0 %      Neutrophils Absolute 7.82 10*3/mm3      Lymphocytes Absolute 1.43 10*3/mm3      Monocytes Absolute 0.19 10*3/mm3      Basophils Absolute 0.10 10*3/mm3      RBC Morphology Normal     Vacuolated Neutrophils Slight/1+     Platelet Morphology Normal    C-reactive Protein [[id number]]  (Abnormal) Collected: 02/15/25 0413    Specimen: Blood from Arm, Left Updated: 02/15/25 0505     C-Reactive Protein 52.80 mg/dL     Magnesium [[id number]]  (Abnormal) Collected: 02/15/25 0413    Specimen: Blood from Arm, Left Updated: 02/15/25 0450     Magnesium 0.9 mg/dL     Comprehensive Metabolic Panel [[id number]]  (Abnormal) Collected: 02/15/25 0413     Specimen: Blood from Arm, Left Updated: 02/15/25 0447     Glucose 116 mg/dL      BUN 18 mg/dL      Creatinine 1.36 mg/dL      Sodium 132 mmol/L      Potassium 3.2 mmol/L      Chloride 93 mmol/L      CO2 19.3 mmol/L      Calcium 8.1 mg/dL      Total Protein 7.1 g/dL      Albumin 3.9 g/dL      ALT (SGPT) 28 U/L      AST (SGOT) 30 U/L      Alkaline Phosphatase 80 U/L      Total Bilirubin 1.0 mg/dL      Globulin 3.2 gm/dL      A/G Ratio 1.2 g/dL      BUN/Creatinine Ratio 13.2     Anion Gap 19.7 mmol/L      eGFR 45.8 mL/min/1.73     Narrative:      GFR Categories in Chronic Kidney Disease (CKD)      GFR Category          GFR (mL/min/1.73)    Interpretation  G1                     90 or greater         Normal or high (1)  G2                      60-89                Mild decrease (1)  G3a                   45-59                Mild to moderate decrease  G3b                   30-44                Moderate to severe decrease  G4                    15-29                Severe decrease  G5                    14 or less           Kidney failure          (1)In the absence of evidence of kidney disease, neither GFR category G1 or G2 fulfill the criteria for CKD.    eGFR calculation 2021 CKD-EPI creatinine equation, which does not include race as a factor    Phosphorus [[id number]]  (Normal) Collected: 02/15/25 0413    Specimen: Blood from Arm, Left Updated: 02/15/25 0447     Phosphorus 3.1 mg/dL     High Sensitivity Troponin T [[id number]]  (Abnormal) Collected: 02/15/25 0413    Specimen: Blood from Arm, Left Updated: 02/15/25 0447     HS Troponin T 14 ng/L     Narrative:      High Sensitive Troponin T Reference Range:  <14.0 ng/L- Negative Female for AMI  <22.0 ng/L- Negative Male for AMI  >=14 - Abnormal Female indicating possible myocardial injury.  >=22 - Abnormal Male indicating possible myocardial injury.   Clinicians would have to utilize clinical acumen, EKG, Troponin, and serial changes to determine if it is an Acute  Myocardial Infarction or myocardial injury due to an underlying chronic condition.         [place] Draw [[id number]] Collected: 02/15/25 0413    Specimen: Blood Updated: 02/15/25 0430    Narrative:      The following orders were created for panel order [place] Draw.  Procedure                               Abnormality         Status                     ---------                               -----------         ------                     Green Top (Gel)[[id number]]                                  Final result               Lavender Top[[id number]]                                     Final result               Gold Top - SST[[id number]]                                   Final result               Light Blue Top[[id number]]                                   Final result                 Please view results for these tests on the individual orders.    Green Top (Gel) [[id number]] Collected: 02/15/25 0413    Specimen: Blood from Arm, Left Updated: 02/15/25 0430     Extra Tube Hold for add-ons.     Comment: Auto resulted.       Lavender Top [[id number]] Collected: 02/15/25 0413    Specimen: Blood Updated: 02/15/25 0430     Extra Tube hold for add-on     Comment: Auto resulted       Gold Top - SST [[id number]] Collected: 02/15/25 0413    Specimen: Blood from Arm, Left Updated: 02/15/25 0430     Extra Tube Hold for add-ons.     Comment: Auto resulted.       Light Blue Top [[id number]] Collected: 02/15/25 0413    Specimen: Blood from Arm, Left Updated: 02/15/25 0430     Extra Tube Hold for add-ons.     Comment: Auto resulted       Calcium, Ionized [[id number]]  (Abnormal) Collected: 02/15/25 0413    Specimen: Blood Updated: 02/15/25 0428     Ionized Calcium 0.91 mmol/L     Blood Culture - Blood, Arm, Left [[id number]] Collected: 02/15/25 0413    Specimen: Blood from Arm, Left Updated: 02/15/25 0422    Blood Culture - Blood, Arm, Left [[id number]] Collected: 02/15/25 0413    Specimen: Blood from Arm, Left Updated: 02/15/25 0422              Imaging Results (Last 24 Hours)       Procedure Component Value Units Date/Time    XR Chest 1 View [[id number]] Collected: 02/15/25 0422     Updated: 02/15/25 0427    Narrative:      XR CHEST 1 VW    Date of Exam: 2/15/2025 4:02 AM EST    Indication: Severe Sepsis triage protocol    Comparison: Chest radiograph 12/28/2024    Findings:  The heart size and pulmonary vascular markings are normal. The right lung is clear. There is faint left lower lobe airspace disease suggestive of pneumonia. There is no pneumothorax. The osseous structures are normal.      Impression:      Impression:  Left lower lobe pneumonia.          Electronically Signed: Riky Cheney MD    2/15/2025 4:25 AM EST    Workstation ID: [id number]              Assessment & Plan        This is a 54 y.o. female with:    Active and Resolved Problems  Active Hospital Problems    Diagnosis  POA    **Pneumonia [J18.9]  Yes      Resolved Hospital Problems   No resolved problems to display.     Sepsis  LLL pneumonia  Acute respiratory failure with hypoxia  On 2 L NC, baseline room air  CXR with left lower lobe pneumonia  WBC 9.5, CRP 52.8 PCT pending  Respiratory panel negative  Received sepsis fluids in ED  Continue vancomycin and cefepime, pharmacy to dose  Sputum cultures, MRSA screen pending  Duoneb q4h  May need pulmonology consult if oxygen needs increased    Acute kidney injury  Hypokalemia  Hypomagnesemia  Nausea, vomiting, diarrhea  Creatinine 1.36, BUN 18, baseline creatinine 0.6  Magnesium 0.9, potassium 3.2  GI panel negative, continue supportive care  IV fluids  Avoid nephrotoxic medications  Continue to monitor BMP, may need nephrology consult if trends upwards  Continue to replete electrolytes per protocol      Hypertension-BP controlled, holding HCTZ and losartan in setting of NORRIS, continue metoprolol  Hyperlipidemia-continue ASA, statin  Chronic pain syndrome-continue home pain medication  GERD-continue PPI        VTE  Prophylaxis:  Pharmacologic VTE prophylaxis orders are present.        The patient desires to be as follows:    CODE STATUS:    Code Status (Patient has no pulse and is not breathing): CPR (Attempt to Resuscitate)  Medical Interventions (Patient has pulse or is breathing): Full Support        Akshat Graham, who can be contacted at [phone], is the designated person to make medical decisions on the patient's behalf if She is incapable of doing so. This was clarified with patient and/or next of kin on 2/15/2025 during the course of this H&P.    Admission Status:  I believe this patient meets inpatient status.    Expected Length of Stay: 3 to 4 days    PDMP and Medication Dispenses via Sidebar reviewed and consistent with patient reported medications.    I discussed the patient's findings and my recommendations with patient.      Signature:     This document has been electronically signed by Judith Dow PA-C on February 15, 2025 08:17 [facility]      Electronically signed by Milo Alvarez MD at 02/15/25 1143          Emergency Department Notes        Minor Colin MD at 02/15/25 0293          Subjective   History of Present Illness  54-year-old female who has been ill since Thursday night with vomiting, diarrhea, dyspnea, cough, frontal headache, fever, nonspecific anterior chest pressure.      Review of Systems   Constitutional:  Positive for fatigue and fever.   Respiratory:  Positive for cough and shortness of breath.    Cardiovascular:  Positive for chest pain.   Gastrointestinal:  Positive for diarrhea, nausea and vomiting.       Past Medical History:   Diagnosis Date    COPD (chronic obstructive pulmonary disease)     Hyperlipidemia     Hypertension     Stroke        Allergies   Allergen Reactions    Iodinated Contrast Media Anaphylaxis    Methylprednisolone Palpitations    Prednisone Palpitations       Past Surgical History:   Procedure Laterality Date    HYSTERECTOMY          Family History   Family history unknown: Yes       Social History     Socioeconomic History    Marital status:    Tobacco Use    Smoking status: Every Day     Current packs/day: 2.00     Types: Cigarettes    Smokeless tobacco: Never   Vaping Use    Vaping status: Never Used   Substance and Sexual Activity    Alcohol use: Yes     Alcohol/week: 3.0 standard drinks of alcohol     Types: 3 Shots of liquor per week    Drug use: No    Sexual activity: Yes           Objective   Physical Exam  Constitutional:       Appearance: She is well-developed.   HENT:      Head: Normocephalic and atraumatic.   Eyes:      Conjunctiva/sclera: Conjunctivae normal.      Pupils: Pupils are equal, round, and reactive to light.   Cardiovascular:      Rate and Rhythm: Tachycardia present.   Pulmonary:      Effort: Pulmonary effort is normal.      Breath sounds: Normal breath sounds.   Abdominal:      General: Bowel sounds are normal. There is no distension.      Palpations: Abdomen is soft.      Tenderness: There is no abdominal tenderness.   Musculoskeletal:         General: Normal range of motion.   Skin:     General: Skin is warm and dry.      Capillary Refill: Capillary refill takes less than 2 seconds.   Neurological:      General: No focal deficit present.      Mental Status: She is alert and oriented to person, place, and time.         Procedures          ED Course                                                       Medical Decision Making  SEPTIC SHOCK FOCUSED EXAM ATTESTATION    I attest that I have reassessed tissue perfusion after the fluid bolus given.    Minor Colin MD  02/15/25  05:56 EST    Patient's mean arterial pressure normalized with sepsis bolus, cover with broad-spectrum antibiotics per protocol, feels much better, tachycardia resolved.    Critical care time 30 minutes    Discussed with  with hospital service, agrees with plan.    Problems Addressed:  Hypokalemia: complicated acute illness or  injury  Hypomagnesemia: complicated acute illness or injury  Pneumonia of left lower lobe due to infectious organism: complicated acute illness or injury  Sepsis, due to unspecified organism, unspecified whether acute organ dysfunction present: complicated acute illness or injury    Amount and/or Complexity of Data Reviewed  Labs: ordered.     Details: Negative PCR panel  Radiology: ordered.  ECG/medicine tests: ordered and independent interpretation performed.     Details: EKG interpretation: Sinus tachycardia, rate 120, baseline wander but no STEMI seen    Risk  Prescription drug management.  Decision regarding hospitalization.        Final diagnoses:   Pneumonia of left lower lobe due to infectious organism   Sepsis, due to unspecified organism, unspecified whether acute organ dysfunction present   Hypomagnesemia   Hypokalemia       ED Disposition  ED Disposition       ED Disposition   Decision to Admit    Condition   --    Comment   Level of Care: Telemetry [5]   Admitting Physician: JO GERBER [[id number]]                 No follow-up provider specified.       Medication List      No changes were made to your prescriptions during this visit.            Minor Colin MD  02/15/25 0717      Electronically signed by Minor Colin MD at 02/15/25 0717       Vital Signs (last 2 days) before discharge       Date/Time Temp Temp src Pulse Resp BP Patient Position SpO2    02/16/25 1452 -- -- 87 16 -- -- 100    02/16/25 1449 -- -- 85 18 -- -- 95    02/16/25 1252 -- -- 85 19 116/67 Sitting 97    02/16/25 1157 -- -- 80 19 -- -- 100    02/16/25 1154 -- -- 83 24 -- -- 100    02/16/25 0703 -- -- 72 20 -- -- 100    02/16/25 0700 -- -- 75 16 -- -- 97    02/16/25 0441 98.3 (36.8) -- 86 16 133/82 -- 95    02/16/25 0007 -- -- 90 13 112/105 Lying 97    02/15/25 2102 98.3 (36.8) Oral 80 14 120/79 Lying 97    02/15/25 2029 -- -- 84 -- 111/68 -- 93    02/15/25 1943 -- -- 87 -- 101/66 -- 94    02/15/25 1831 -- -- 80 20 104/67  -- 98    02/15/25 1828 -- -- 84 22 -- -- 97    02/15/25 1800 -- -- 84 -- 109/63 -- 91    02/15/25 1701 -- -- 79 -- 111/75 -- 94    02/15/25 1646 -- -- 80 -- 102/68 -- 93    02/15/25 1631 -- -- 78 -- 110/69 -- 93    02/15/25 1618 -- -- 78 -- 102/66 -- 94    02/15/25 1547 -- -- 80 -- 104/69 -- 93    02/15/25 1443 -- -- 87 23 -- -- 97    02/15/25 1440 -- -- 91 22 -- -- 96    02/15/25 1346 -- -- 86 -- 114/75 -- 91    02/15/25 1329 -- -- 89 -- 102/64 -- 92    02/15/25 1319 -- -- 93 -- 119/71 -- --    02/15/25 1301 -- -- 74 -- 103/68 -- 94    02/15/25 1249 -- -- 80 -- -- -- 95    02/15/25 1246 -- -- 84 -- 106/70 -- --    02/15/25 1159 -- -- 81 -- 107/70 -- 96    02/15/25 1147 -- -- -- -- -- -- 97    02/15/25 1146 -- -- 91 -- 103/68 -- --    02/15/25 1145 -- -- -- -- -- -- 96    02/15/25 1116 -- -- 80 -- 108/70 -- 95    02/15/25 1014 -- -- 80 -- 99/63 -- 94    02/15/25 0959 -- -- 87 -- 101/62 -- 95    02/15/25 0934 -- -- 85 -- 114/85 -- 94    02/15/25 0646 -- -- 85 -- 112/70 -- 94    02/15/25 0618 -- -- 93 -- -- -- 97    02/15/25 0617 -- -- 87 -- -- -- 96    02/15/25 0616 -- -- 92 -- 103/63 -- 95    02/15/25 0547 -- -- 89 27 94/53 Sitting 97    02/15/25 0501 -- -- 92 22 93/56 Sitting 97    02/15/25 0500 -- -- 91 -- 97/66 -- 97    02/15/25 0421 -- -- 119 -- 134/96 -- 93    02/15/25 0340 -- -- -- -- 72/42 -- --    02/15/25 0337 98.8 (37.1) Oral 142 24 -- -- 92          Oxygen Therapy (last 2 days) before discharge       Date/Time SpO2 Device (Oxygen Therapy) Flow (L/min) (Oxygen Therapy) Oxygen Concentration (%) ETCO2 (mmHg)    02/16/25 1452 100 room air -- -- --    02/16/25 1449 95 room air -- -- --    02/16/25 1252 97 nasal cannula 2 -- --    02/16/25 1157 100 nasal cannula 2 -- --    02/16/25 1154 100 nasal cannula 2 -- --    02/16/25 0810 -- room air -- -- --    02/16/25 0703 100 nasal cannula 2 -- --    02/16/25 0700 97 nasal cannula 2 -- --    02/16/25 0441 95 nasal cannula 2 -- --    02/16/25 0007 97 nasal cannula 2  -- --    02/15/25 2102 97 nasal cannula 2 -- --    02/15/25 2029 93 -- -- -- --    02/15/25 1943 94 -- -- -- --    02/15/25 1831 98 nasal cannula 2 -- --    02/15/25 1828 97 nasal cannula 2 -- --    02/15/25 1800 91 -- -- -- --    02/15/25 1701 94 -- -- -- --    02/15/25 1646 93 -- -- -- --    02/15/25 1631 93 -- -- -- --    02/15/25 1618 94 -- 2 -- --    02/15/25 1547 93 -- -- -- --    02/15/25 1443 97 nasal cannula 2 -- --    02/15/25 1440 96 nasal cannula 2 -- --    02/15/25 1346 91 -- -- -- --    02/15/25 1329 92 -- -- -- --    02/15/25 1301 94 -- -- -- --    02/15/25 1249 95 -- -- -- --    02/15/25 1159 96 -- -- -- --    02/15/25 1147 97 -- -- -- --    02/15/25 1145 96 -- -- -- --    02/15/25 1116 95 -- -- -- --    02/15/25 1014 94 -- -- -- --    02/15/25 0959 95 -- -- -- --    02/15/25 0934 94 -- -- -- --    02/15/25 0646 94 -- -- -- --    02/15/25 0618 97 -- -- -- --    02/15/25 0617 96 -- -- -- --    02/15/25 0616 95 -- -- -- --    02/15/25 0547 97 nasal cannula 2 -- --    02/15/25 0501 97 nasal cannula 2 -- --    02/15/25 0500 97 -- -- -- --    02/15/25 0421 93 -- -- -- --    02/15/25 0337 92 room air -- -- --          Facility-Administered Medications as of 2025   Medication Dose Route Frequency Provider Last Rate Last Admin    [COMPLETED] cefepime 2000 mg IVPB in 100 mL NS (MBP)  2,000 mg Intravenous Once Minor Colin MD   Stopped at 02/15/25 0537    [COMPLETED] magnesium sulfate 2g/50 mL (PREMIX) infusion  2 g Intravenous Once AustinMinor bocanegra MD   Stopped at 02/15/25 1141    [COMPLETED] potassium chloride (KLOR-CON M20) CR tablet 40 mEq  40 mEq Oral Once Minor Colin MD   40 mEq at 02/15/25 0537    [COMPLETED] sepsis fluid NS 0.9 % bolus 1,860 mL  30 mL/kg Intravenous Once Minor Colin MD   1,860 mL at 02/15/25 0422    [COMPLETED] sodium bicarbonate injection 8.4% 25 mEq  25 mEq Intravenous Once Marlen Marie MD   25 mEq at 25 1123    [] sodium chloride 0.9 % infusion  100 mL/hr  Intravenous Continuous Judith Dow PA-C 100 mL/hr at 02/15/25 1510 100 mL/hr at 02/15/25 1510    [COMPLETED] Vancomycin HCl 1,250 mg in sodium chloride 0.9 % 250 mL VTB  20 mg/kg Intravenous Once Ballston SpaMinor MD   Stopped at 02/15/25 1141     Imaging Results (All)       Procedure Component Value Units Date/Time    XR Chest 1 View [[id number]] Collected: 02/15/25 0422     Updated: 02/15/25 0427    Narrative:      XR CHEST 1 VW    Date of Exam: 2/15/2025 4:02 AM EST    Indication: Severe Sepsis triage protocol    Comparison: Chest radiograph 2024    Findings:  The heart size and pulmonary vascular markings are normal. The right lung is clear. There is faint left lower lobe airspace disease suggestive of pneumonia. There is no pneumothorax. The osseous structures are normal.      Impression:      Impression:  Left lower lobe pneumonia.          Electronically Signed: Riky Cheney MD    2/15/2025 4:25 AM EST    Workstation ID: [id number]             Physician Progress Notes (last 72 hours)        Marlen Marie MD at 25 0941              [facility] MEDICINE SERVICE  DAILY PROGRESS NOTE    NAME: Kamini Graham  : 1970  MRN: [id number]      LOS: 1 day     PROVIDER OF SERVICE: Marlen Marie MD    Chief Complaint: Pneumonia    Subjective:     Interval History:    Patient seen and evaluated at bedside.   H&P, labs and imaging reviewed.  Patient complaining of diarrhea since Thursday.  3 bowel movements since morning.  Patient on 3 L per nasal cannula O2 sats 97%.   at the bedside.  Treatment plan discussed with patient. All questions addressed.     Review of Systems:   All 21 ROS were negative except mentioned above.    Objective:     Vital Signs  Temp:  [98.3 °F (36.8 °C)] 98.3 °F (36.8 °C)  Heart Rate:  [72-93] 72  Resp:  [13-23] 20  BP: ()/() 133/82  Flow (L/min) (Oxygen Therapy):  [2] 2   Body mass index is 26.56 kg/m².    Physical Exam   General: acute distress, appears  stated age  Neuro: Awake and alert, oriented x3, no focal deficits appreciated  Head: Atraumatic, normocephalic  HEENT: EOMI, anicteric, normal sclerae and conjunctivae, moist mucus membranes  Neck: supple, no lymphadenopathy  CV: RRR, soft heart sounds, no murmurs appreciated, no peripheral edema  Pulm: Decreased breath sounds,  increased work of breathing, no adventitious sounds  Abd: Soft, nontender, nondistended  Skin: Warm, dry and intact  Psych: Appropriate mood and affect    Scheduled Meds   aspirin, 81 mg, Oral, Daily  atorvastatin, 80 mg, Oral, Nightly  cefepime, 2,000 mg, Intravenous, Q12H  guaiFENesin, 1,200 mg, Oral, Q12H  heparin (porcine), 5,000 Units, Subcutaneous, Q8H  [Held by provider] losartan, 50 mg, Oral, Q24H   And  [Held by provider] hydroCHLOROthiazide, 12.5 mg, Oral, Q24H  ipratropium-albuterol, 3 mL, Nebulization, 4x Daily - RT  metoprolol succinate XL, 25 mg, Oral, Daily  montelukast, 10 mg, Oral, Nightly  nicotine, 1 patch, Transdermal, Q24H  pantoprazole, 40 mg, Oral, BID  sodium chloride, 10 mL, Intravenous, Q12H  vancomycin, 15 mg/kg, Intravenous, Q24H       PRN Meds     acetaminophen **OR** acetaminophen **OR** acetaminophen    albuterol    benzonatate    senna-docusate sodium **AND** polyethylene glycol **AND** bisacodyl **AND** bisacodyl    Calcium Replacement - Follow Nurse / BPA Driven Protocol    HYDROcodone-acetaminophen    Magnesium Standard Dose Replacement - Follow Nurse / BPA Driven Protocol    melatonin    ondansetron ODT **OR** ondansetron    Pharmacy to Dose Cefepime    Pharmacy to dose vancomycin    Phosphorus Replacement - Follow Nurse / BPA Driven Protocol    Potassium Replacement - Follow Nurse / BPA Driven Protocol    sodium chloride    sodium chloride    sodium chloride   Infusions  Pharmacy to Dose Cefepime,   Pharmacy to dose vancomycin,           Diagnostic Data    Results from last 7 days   Lab Units 02/15/25  2217 02/15/25  0413   WBC 10*3/mm3 6.75 9.54    HEMOGLOBIN g/dL 12.2 14.2   HEMATOCRIT % 36.6 41.2   PLATELETS 10*3/mm3 165 208   GLUCOSE mg/dL 89 116*   CREATININE mg/dL 0.91 1.36*   BUN mg/dL 20 18   SODIUM mmol/L 127* 132*   POTASSIUM mmol/L 3.8 3.2*   AST (SGOT) U/L  --  30   ALT (SGPT) U/L  --  28   ALK PHOS U/L  --  80   BILIRUBIN mg/dL  --  1.0   ANION GAP mmol/L 13.9 19.7*       XR Chest 1 View    Result Date: 2/15/2025  Impression: Left lower lobe pneumonia. Electronically Signed: Riky Cheney MD  2/15/2025 4:25 AM EST  Workstation ID: [id number]     Interval results reviewed.    Assessment/Plan:   Acute hypoxic respiratory failure  Left lower lobe pneumonia unspecified  Hyponatremia  Hypocalcemia  Diarrhea    Continue oxygen 3 L per nasal cannula, monitor sats  MRSA DNA PCR negative will DC vancomycin and continue cefepime  Blood cultures no growth sputum cultures pending, end date CRP and procalcitonin    Hyponatremia could be due to diarrhea versus SIADH caused by pneumonia  Will check urine sodium, TSH, cortisol  Sodium bicarb 25 mg IV  Repeat BMP    Start on calcium carbonate 2 tablets 3 times daily for hypocalcemia    Give Imodium for diarrhea        Treatment plan discussed with RN.     VTE Prophylaxis:  Pharmacologic VTE prophylaxis orders are present.         Code status is   Code Status and Medical Interventions: CPR (Attempt to Resuscitate); Full Support   Ordered at: 02/15/25 0816     Code Status (Patient has no pulse and is not breathing):    CPR (Attempt to Resuscitate)     Medical Interventions (Patient has pulse or is breathing):    Full Support       Plan for disposition:     Barriers to Discharge: On oxygen, IV antibiotics  Anticipated Date of Discharge: 2/18/2025  Place of Discharge: Home      Time: 40 minutes     Signature: Electronically signed by Marlen Marie MD, 02/16/25, 09:41 EST.  [facility] Hospitalist Team     Electronically signed by Marlen Marie MD at 02/16/25 0955       Discharge Summary    No notes of this type exist for  this encounter.       Discharge Order (From admission, onward)      None

## 2025-02-17 NOTE — CASE MANAGEMENT/SOCIAL WORK
Case Management Discharge Note      Final Note: AMA  LEFT PRIOR TO CASE MANAGEMENT SEEING PATIENT         Selected Continued Care - Discharged on 2/16/2025 Admission date: 2/15/2025 - Discharge disposition: Left Against Medical Advice            Transportation Services  Private: Car    Final Discharge Disposition Code: 07 - left AMA

## 2025-02-18 LAB
BACTERIA SPEC RESP CULT: NORMAL
GRAM STN SPEC: NORMAL

## 2025-02-20 LAB
BACTERIA SPEC AEROBE CULT: NORMAL
BACTERIA SPEC AEROBE CULT: NORMAL

## 2025-03-07 ENCOUNTER — DOCUMENTATION (OUTPATIENT)
Dept: INTERNAL MEDICINE | Facility: HOSPITAL | Age: 55
End: 2025-03-07
Payer: MEDICAID